# Patient Record
Sex: FEMALE | Race: WHITE
[De-identification: names, ages, dates, MRNs, and addresses within clinical notes are randomized per-mention and may not be internally consistent; named-entity substitution may affect disease eponyms.]

---

## 2018-10-03 NOTE — HP
PREOPERATIVE HISTORY AND PHYSICAL:

 

DATE OF ADMISSION:  10/11/18

 

PROVIDER:  Dr. Tori Arreola.* (DICTATED BY MARY BELLO)

 

CHIEF COMPLAINT:  Left knee pain.

 

HISTORY OF PRESENT ILLNESS:  Ms. Mehta is a 53-year-old female, who had an 
acute traumatic injury on 08/11/18, she fell on a boat propeller twisting her 
left knee. She has had mechanical symptoms along the medial joint line since 
that time with continued 5/10 pain in the knee.  She has clicking, catching, 
and feels like something is moving.  She tried antiinflammatories and bracing.  
She also tried icing and resting without any relief of pain.  She is interested 
in surgical intervention for correction of the problem.

 

PAST MEDICAL HISTORY:  Hypercholesterolemia and anxiety.

 

PAST SURGICAL HISTORY:  Ablation surgery, tubular pregnancy surgery with 
removal of the tube.

 

She reports no complications with anesthesia with those procedures.

 

CURRENT MEDICATIONS:

1.  Lexapro 20 mg p.o. q. day.

2.  Vitamin D supplementation daily.

 

ALLERGIES:  PENICILLIN.

 

FAMILY HISTORY:  Positive for hypertension in her mom and sister.

 

SOCIAL HISTORY:  She works as a registered nurse at [employer]. She lives with her .  She smokes 7 cigarettes per day.  She 
drinks 2 alcoholic beverages per week and she is normally very active.

 

REVIEW OF SYSTEMS:  A 14-point review of systems was discussed with the 
patient. All systems were negative except for left knee pain and swelling, 
history of night sweats, kidney stones, previous fracture, fatigue, and 
anxiety.  All other systems were negative.

 

                               PHYSICAL EXAMINATION

 

GENERAL:  She is a well-developed, well-nourished, pleasant female, in no acute 
distress at rest.  She is alert and oriented x3 with appropriate mood and 
affect.

 

VITAL SIGNS:  The patient is 5 feet 1 inch, 152 pounds.  Blood pressure 168/92, 
pulse 76, temperature 97.8.

 

HEENT:  Normocephalic, atraumatic.  Hearing and vision are grossly intact.

 

NECK:  Her trachea is midline.

 

RESPIRATORY:  Lungs are clear to auscultation bilaterally.  No wheezes, rales, 
or rhonchi.

 

CARDIOVASCULAR:  Regular rate and rhythm.  No murmurs, rubs, or gallops.  
Normal S1, S2.

 

ABDOMEN:  Soft, nondistended, nontender.  Normal bowel sounds.

 

EXTREMITIES:  Exam of the left lower extremity:  Her skin is intact without 
abrasions or open wounds.  She has a moderate effusion at the knee joint.  She 
is tender to palpation along the medial joint line.  There are no palpable 
masses or lymph nodes.  She has range of motion of 10 to 125 degrees of 
flexion.  Positive Apley's, positive Faiza's.  No varus or valgus 
instability.  Lachman's is stable as well.  Distally, she is neurovascularly 
intact.

 

GAIT:  She ambulates with an antalgic gait favoring left knee.  She has a 
significant limp and takes slow, shortened steps.  There are no obvious balance 
or coordination deficits.

 

 DIAGNOSTIC STUDIES:  Imaging:  MRI of the left knee was reviewed with the 
patient. She has a large tear of the medial meniscus body and posterior horn.  
She has an avulsion of the inner third with some displacement into the medial 
gutter.  She has some moderate grade hyaline cartilage fissuring along the 
medial femoral condyle and a chronic partial tear of the ACL.

 

IMPRESSION:  Left knee medial meniscus tear.

 

PLAN:  The patient is to undergo left knee arthroscopic surgery with partial 
meniscectomy, possible chondroplasty by Dr. Arreola on 10/11/18.  The risks, 
benefits, and postoperative course were discussed with the patient at length 
and she would like to proceed.  All of her questions were answered to her full 
satisfaction.

 

 ____________________________________ MARY BELLO

 

[id number] #: [id number]

MTDMADELYN

## 2018-10-11 ENCOUNTER — HOSPITAL ENCOUNTER (OUTPATIENT)
Dept: HOSPITAL 25 - OR | Age: 53
Discharge: HOME | End: 2018-10-11
Attending: ORTHOPAEDIC SURGERY
Payer: COMMERCIAL

## 2018-10-11 VITALS — DIASTOLIC BLOOD PRESSURE: 90 MMHG | SYSTOLIC BLOOD PRESSURE: 124 MMHG

## 2018-10-11 DIAGNOSIS — E78.5: ICD-10-CM

## 2018-10-11 DIAGNOSIS — F41.8: ICD-10-CM

## 2018-10-11 DIAGNOSIS — S83.232A: Primary | ICD-10-CM

## 2018-10-11 DIAGNOSIS — M17.12: ICD-10-CM

## 2018-10-11 DIAGNOSIS — Z72.0: ICD-10-CM
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
## 2018-10-11 DIAGNOSIS — E78.00: ICD-10-CM
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
## 2018-10-11 RX ADMIN — FENTANYL CITRATE PRN MCG: 0.05 INJECTION, SOLUTION INTRAMUSCULAR; INTRAVENOUS at 10:13

## 2018-10-11 RX ADMIN — FENTANYL CITRATE PRN MCG: 0.05 INJECTION, SOLUTION INTRAMUSCULAR; INTRAVENOUS at 10:00

## 2018-10-12 NOTE — OP
OPERATIVE NOTE:

 

DATE OF OPERATION:  10/11/18 - SDS

 

DATE OF BIRTH:  [birth year]

 

ATTENDING SURGEON:  Tori Arreola MD

 

ASSISTANT:  MARY Villasenor

 

Mr. Irby did help throughout the procedure with preparation of the leg, wound 
retraction, manipulation of the knee and wound closure.

 

ANESTHESIOLOGIST:  Dr. Lin.

 

ANESTHESIA:  General.

 

PRE-OP DIAGNOSES:  Left knee medial meniscal tear, mild osteoarthritis.

 

POST-OP DIAGNOSIS:  Left knee medial meniscal tear, severe osteoarthritis.

 

OPERATIVE PROCEDURE:  Left knee arthroscopy with partial medial meniscectomy 
and medial chondroplasty.

 

COMPLICATIONS:  None.

 

ESTIMATED BLOOD LOSS:  Less than 25 cc.

 

SPECIMEN:  None.

 

BRIEF HISTORY/INDICATIONS:  Ms. Mehta is a 53-year-old female who had a 
twisting injury to her knee in August.  She developed mechanical symptoms along 
the medial joint line.  She failed conservative treatment and an MRI did 
confirm a medial meniscal tear.  The patient failed conservative treatment and 
continued to have recurrent medial joint line pain and mechanical symptoms.  
She elected to undergo left knee arthroscopy with partial meniscectomy, 
possible chondroplasty, possible synovectomy due to continued pain and 
decreased quality of life.  Informed consent was obtained from the patient.  
She understood the risks of surgery included, but were not limited to bleeding, 
infection, damage to nearby structures, continued pain, need for further surgery
, retear of the meniscus, progression of arthritis, stroke, heart attack, blood 
clot, and death.  She wished to proceed.

 

INTRAOPERATIVE FINDINGS:  Intraoperatively, the patient was noted to have a 
complex tear of the medial meniscus involving the posterior two thirds.  This 
was mainly in the white-red zone.  She did have a large fragment, which was 
flipped into the medial gutter.  She had grade 3 and 4 Outerbridge cartilage 
changes of the medial compartment with exposed subchondral bone and cartilage 
flapping.

 

DESCRIPTION OF PROCEDURE:  Ms. Mehta was identified in the preanesthesia unit.  
Her left lower extremity was marked as the correct operative side.  Informed 
consent was signed and placed in the chart.  The patient was taken to the 
operating room and placed under general anesthesia.  Left lower extremity was 
prepped and draped in the usual sterile fashion.  Preop time-out was made to 
correctly identify the patient, side and site.  Appropriate perioperative 
antibiotics were given within 1 hour of incision.

 

A 0.5 cm anterolateral portal incision was made with a 15-blade and carried 
down through the skin and capsule.  Trocar was introduced.  As soon as the 
light and water sources were turned on, there was immediate visualization of 
the suprapatellar pouch.  A tour was performed.  Suprapatellar pouch had no 
obvious abnormality.  Patellofemoral compartment had some grade 2 and 3 minimal 
cartilage changes.  Medial gutter showed no plica or loose body.  Medial 
compartment showed grade 3 and 4 Outerbridge cartilage changes involving the 
majority of the weightbearing surface of the medial femoral condyle.  There was 
a large amount of cartilage flapping and exposed subchondral bone.  Medial 
meniscus had a complex tear.  This involved the posterior two thirds of the 
medial meniscus.  ACL and PCL appeared to be intact.  The knee was placed in 
the figure-of-four position.  There was no obvious meniscal tear.  No 
significant degenerative changes in the lateral compartment.  The lateral 
gutter showed no loose body or plica.

 

Under direct visualization, a medial portal incision was made.  Probe was 
introduced.  There were no additional findings.  Probing of the medial meniscus 
displaced a large meniscal tear fragment from the medial gutter into the joint 
space.  Straight biter and shaver were used to perform partial medial 
meniscectomy. A smooth border of the medial meniscus was obtained in the white-
red zone mainly. Further probing of the meniscus showed no additional displaced 
meniscal tears or fragments.  Radiofrequency ablation wand was used to further 
smooth the edge of the meniscus.  Radiofrequency ablation wand was then used to 
perform chondroplasty along the cartilage flaps in the medial compartment.

 

The knee was copiously irrigated with sterile saline.  The instruments were 
carefully removed.  The incisions were closed using 3-0 nylon suture. 
Intraarticular injection of 80 mg Depo-Medrol and 6 cc of 0.25% Marcaine was 
placed in the knee joint.  The patient's incisions were covered with Xeroform, 
4x4s, and Webril.  Ace wrap and cold pack were used to cover this.  The patient'
s anesthesia was reversed without difficulty.  She was taken to the PACU in 
stable condition. Intended weightbearing will be weightbearing as tolerated.  
Intended DVT prophylaxis will be aspirin.  She will follow up in 2 weeks' time 
for suture removal.

 

 [id number] #: [id number]

[facility] [place]

## 2018-11-07 ENCOUNTER — HOSPITAL ENCOUNTER (INPATIENT)
Dept: HOSPITAL 25 - SSU | Age: 53
LOS: 5 days | Discharge: HOME | DRG: 344 | End: 2018-11-12
Attending: ORTHOPAEDIC SURGERY | Admitting: ORTHOPAEDIC SURGERY
Payer: COMMERCIAL

## 2018-11-07 DIAGNOSIS — Z82.49: ICD-10-CM

## 2018-11-07 DIAGNOSIS — B95.61: ICD-10-CM

## 2018-11-07 DIAGNOSIS — I95.9: ICD-10-CM

## 2018-11-07 DIAGNOSIS — Z79.899: ICD-10-CM

## 2018-11-07 DIAGNOSIS — I10: ICD-10-CM

## 2018-11-07 DIAGNOSIS — E78.5: ICD-10-CM

## 2018-11-07 DIAGNOSIS — M00.062: Primary | ICD-10-CM

## 2018-11-07 DIAGNOSIS — F17.210: ICD-10-CM

## 2018-11-07 DIAGNOSIS — Z88.0: ICD-10-CM

## 2018-11-07 DIAGNOSIS — F41.9: ICD-10-CM

## 2018-11-07 LAB
BASOPHILS # BLD AUTO: 0 10^3/UL (ref 0–0.2)
BASOPHILS # BLD AUTO: 0 10^3/UL (ref 0–0.2)
EOSINOPHIL # BLD AUTO: 0.2 10^3/UL (ref 0–0.6)
EOSINOPHIL # BLD AUTO: 0.3 10^3/UL (ref 0–0.6)
HCT VFR BLD AUTO: 32 % (ref 35–47)
HCT VFR BLD AUTO: 36 % (ref 35–47)
HGB BLD-MCNC: 11 G/DL (ref 12–16)
HGB BLD-MCNC: 12.2 G/DL (ref 12–16)
INR PPP/BLD: 0.86 (ref 0.77–1.02)
LYMPHOCYTES # BLD AUTO: 2.2 10^3/UL (ref 1–4.8)
LYMPHOCYTES # BLD AUTO: 2.4 10^3/UL (ref 1–4.8)
MCH RBC QN AUTO: 31 PG (ref 27–31)
MCH RBC QN AUTO: 31 PG (ref 27–31)
MCHC RBC AUTO-ENTMCNC: 34 G/DL (ref 31–36)
MCHC RBC AUTO-ENTMCNC: 35 G/DL (ref 31–36)
MCV RBC AUTO: 89 FL (ref 80–97)
MCV RBC AUTO: 90 FL (ref 80–97)
MONOCYTES # BLD AUTO: 0.6 10^3/UL (ref 0–0.8)
MONOCYTES # BLD AUTO: 0.6 10^3/UL (ref 0–0.8)
NEUTROPHILS # BLD AUTO: 3.4 10^3/UL (ref 1.5–7.7)
NEUTROPHILS # BLD AUTO: 4.6 10^3/UL (ref 1.5–7.7)
NRBC # BLD AUTO: 0 10^3/UL
NRBC # BLD AUTO: 0 10^3/UL
NRBC BLD QL AUTO: 0
NRBC BLD QL AUTO: 0.1
PLATELET # BLD AUTO: 221 10^3/UL (ref 150–450)
PLATELET # BLD AUTO: 249 10^3/UL (ref 150–450)
RBC # BLD AUTO: 3.56 10^6/UL (ref 4–5.4)
RBC # BLD AUTO: 3.99 10^6/UL (ref 4–5.4)
RBC UR QL AUTO: (no result)
WBC # BLD AUTO: 6.6 10^3/UL (ref 3.5–10.8)
WBC # BLD AUTO: 7.9 10^3/UL (ref 3.5–10.8)
WBC UR QL AUTO: (no result)

## 2018-11-07 PROCEDURE — 80048 BASIC METABOLIC PNL TOTAL CA: CPT

## 2018-11-07 PROCEDURE — 81015 MICROSCOPIC EXAM OF URINE: CPT

## 2018-11-07 PROCEDURE — 90686 IIV4 VACC NO PRSV 0.5 ML IM: CPT

## 2018-11-07 PROCEDURE — 0S9D3ZX DRAINAGE OF LEFT KNEE JOINT, PERCUTANEOUS APPROACH, DIAGNOSTIC: ICD-10-PCS | Performed by: ORTHOPAEDIC SURGERY

## 2018-11-07 PROCEDURE — 71045 X-RAY EXAM CHEST 1 VIEW: CPT

## 2018-11-07 PROCEDURE — 36415 COLL VENOUS BLD VENIPUNCTURE: CPT

## 2018-11-07 PROCEDURE — 86140 C-REACTIVE PROTEIN: CPT

## 2018-11-07 PROCEDURE — 84100 ASSAY OF PHOSPHORUS: CPT

## 2018-11-07 PROCEDURE — 81003 URINALYSIS AUTO W/O SCOPE: CPT

## 2018-11-07 PROCEDURE — 85027 COMPLETE CBC AUTOMATED: CPT

## 2018-11-07 PROCEDURE — 80053 COMPREHEN METABOLIC PANEL: CPT

## 2018-11-07 PROCEDURE — 93005 ELECTROCARDIOGRAM TRACING: CPT

## 2018-11-07 PROCEDURE — 83735 ASSAY OF MAGNESIUM: CPT

## 2018-11-07 PROCEDURE — 85610 PROTHROMBIN TIME: CPT

## 2018-11-07 PROCEDURE — 87086 URINE CULTURE/COLONY COUNT: CPT

## 2018-11-07 PROCEDURE — C1751 CATH, INF, PER/CENT/MIDLINE: HCPCS

## 2018-11-07 PROCEDURE — 85025 COMPLETE CBC W/AUTO DIFF WBC: CPT

## 2018-11-07 PROCEDURE — 83605 ASSAY OF LACTIC ACID: CPT

## 2018-11-07 PROCEDURE — 80202 ASSAY OF VANCOMYCIN: CPT

## 2018-11-07 PROCEDURE — 3E1U38X IRRIGATION OF JOINTS USING IRRIGATING SUBSTANCE, PERCUTANEOUS APPROACH, DIAGNOSTIC: ICD-10-PCS | Performed by: ORTHOPAEDIC SURGERY

## 2018-11-07 PROCEDURE — 87073 CULTURE BACTERIA ANAEROBIC: CPT

## 2018-11-07 PROCEDURE — 87205 SMEAR GRAM STAIN: CPT

## 2018-11-07 PROCEDURE — 87070 CULTURE OTHR SPECIMN AEROBIC: CPT

## 2018-11-07 PROCEDURE — 87040 BLOOD CULTURE FOR BACTERIA: CPT

## 2018-11-07 PROCEDURE — 85652 RBC SED RATE AUTOMATED: CPT

## 2018-11-07 RX ADMIN — FENTANYL CITRATE PRN MCG: 0.05 INJECTION, SOLUTION INTRAMUSCULAR; INTRAVENOUS at 19:17

## 2018-11-07 RX ADMIN — FENTANYL CITRATE PRN MCG: 0.05 INJECTION, SOLUTION INTRAMUSCULAR; INTRAVENOUS at 19:00

## 2018-11-07 RX ADMIN — FENTANYL CITRATE PRN MCG: 0.05 INJECTION, SOLUTION INTRAMUSCULAR; INTRAVENOUS at 18:43

## 2018-11-07 RX ADMIN — FENTANYL CITRATE PRN MCG: 0.05 INJECTION, SOLUTION INTRAMUSCULAR; INTRAVENOUS at 18:37

## 2018-11-07 NOTE — PN
Hospitalist Progress Note


Date of Service: 11/07/18





got called reg her sbp down to 70s ---> pt is asymptomatic ---> stat cbc/bmp/

lactic and ua ordered bolus one liter of ns ---> sbp went up to 94 ---> one 

dose of narcan 0.4 given ---> sbp 119 ---> pt looks comfortable after narcan ---

> she will get her benadryl and melatonin for sleep for now then readdress her 

pain regimen in am

## 2018-11-07 NOTE — HP
PRE-ADMISSION HISTORY AND PHYSICAL EXAM:

 

DATE OF ADMISSION:  11/07/18

 

CHIEF COMPLAINT:  Left knee pain.

 

INTERVAL HISTORY:  Ms. Mehta is a 53-year-old female who had on 10/11/18 left 
knee arthroscopy with partial medial meniscectomy and medial chondroplasty.  
She had twisting injury to the knee originally in August 2018 with subsequent 
mechanical symptoms.  MRI confirmed a medial meniscal tear.  She failed 
conservative treatment and underwent a successful arthroscopy on 10/11/18. At 
the patient's postoperative appointment, her sutures were removed. Her 
incisions were healing well with no sign of infection.  She was doing quite 
well and wished to return to work.

 

Today, the patient reports over the last 24 hours, she developed severe pain in 
the left knee.  She had difficulty bearing weight last evening and called the 
office this morning. She denies any fevers, but has had chills and has not felt 
well over the last 24 hours.  She reports that last Friday 5 days ago, she had 
a small amount of clear and then purulent drainage from the lateral portal 
incision, but since then the portal closed up and did not drain any further.  
She is having increased 10/10 pain with any bending or extending the knee.

 

PAST MEDICAL HISTORY:  Hypercholesterolemia, anxiety.

 

PAST SURGICAL HISTORY:  Tubular pregnancy ablation; on 10/11/18, left knee 
arthroscopy.

 

FAMILY HISTORY:  Maternal hypertension.

 

SOCIAL HISTORY:  The patient works as a nurse.  She lives with her .  
She does smoke tobacco.  Has 2 alcoholic beverages per week.  Normally very 
active, right hand dominant.

 

REVIEW OF SYSTEMS:  Positive for the recent left knee pain, some chills. 
Otherwise, the patient reports review of systems is negative or not relevant.

 

                               PHYSICAL EXAMINATION

 

GENERAL:  The patient is a well nourished female, in obvious distress, she is 
in obvious pain.  Alert and oriented x3, pleasant mood, appropriate affect.

 

VITAL SIGNS:  Temperature 98.4, blood pressure 138/90, heart rate 75, BMI of 
28.3.

 

GAIT:  The patient's gait is antalgic.  She is having difficulty stepping on 
the left knee.  Coordination normal.

 

HEENT:  Atraumatic, normocephalic.  Pupils equal and reactive to light.

 

LUNGS:  Clear to auscultation in all lung fields.

 

HEART:  S1 and S2.

 

ABDOMEN:  Soft, nontender, nondistended.

 

EXTREMITIES:  Left lower extremity, the patient's skin is intact.  Her lateral 
portal incision does have a scab with a small amount of erythema.  She has a 
moderate to large effusion at the knee joint.  Any palpation in the 
peripatellar region causes extreme pain.  She has 20 to 80 degrees of flexion 
because of pain. Distally neurovascularly intact.  Calf is soft.

 

 RADIOGRAPHY:  Plain films of the left knee are obtained in clinic and showed 
no obvious abnormality.

 

ASSESSMENT AND PLAN:  Ms. Mehta is a 53-year-old female who had left knee 
arthroscopy with partial medial meniscectomy on 10/11/18.  Initially, she did 
quite well.  Today in clinic, I am concerned about infection of the left knee 
joint.  She does have history of some purulent drainage, although she is not 
draining at this point.

 

The patient consented to have aspiration of the left knee to further evaluate 
the effusion and the type of fluid.  Her left knee was sterilely prepped with 
Betadine. An 18-gauge needle was used to aspirate 20 cc of thick purulent 
yellow fluid.

 

The patient and I discussed that I felt her left knee was infected.  She has 
not had anything to eat or drink since 7 a.m.  She had black coffee.  We will 
direct her to [facility] for an infected left knee joint.  We will 
take her to the next available operating room for arthroscopic washout of the 
left knee.  We will send the fluid aspirated now for cultures and 
sensitivities.  Upon admission to the hospital, she will have a Hospitalist 
consult and Infectious Disease consult.  We will obtain some laboratory values.

 

She will be limited activity with p.r.n. analgesia.

 

The patient understands the treatment plan and agrees.  We will see her at the 
hospital in the preanesthesia unit.

 

 

 

[id number]/CPS #: [id number]

[facility]

## 2018-11-07 NOTE — HP
*** AMENDED REPORT NOW INCLUDES DESIGNATED COSIGNER ***

 

DATE OF ADMISSION:  11/07/2018.

 

ATTENDING PHYSICIAN:  Dr. Tori Arreola * (dictated by MARY Jasso).

 

CHIEF COMPLAINT:  Left knee pain.

 

HISTORY OF PRESENT ILLNESS:  Ms. Mehta is a 53-year-old female with five days 
of increasingly severe left knee pain, she was sent in as a direct admit from 
our office.  Her knee was aspirated today and fluid sent for analysis.  The 
patient reports that the aspirate material was cloudy in nature and she was 
sent immediately to [facility] for direct admission where it is 
anticipated that she will have a washout of her left knee tonight. She does 
have a history of left knee arthroscopy with partial medial meniscectomy and 
medial chondroplasty on 10/11/2018.  The patient states that she was healing 
quite well after the surgery and had the ability to walk unassisted without pain
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 During her healing process she went 
to our [facility] office for suture removal and there was no erythema or edema 
at this time.  She did have some mild clear discharge from one of the incision 
sites soon after suture removal which later progressed to cloudy colored 
drainage.  She did not have any redness of the knee and really did not have any 
pain of the knee until Saturday November 3rd.  November 3rd, the patient did 
quite a bit of yard work and closed her pool, stating that she thought that she 
overdid it and her knee became very swollen and painful.  Her knee since then 
has become exceedingly more painful and swollen without any relieving factors. 
Activity worsens the pain.  She has been feeling nauseous. She has not had any 
known fever or chills.  She does not have any recent illness. The patient's 
last meal was yesterday.  Her only oral consumption today was coffee with 
breakfast this morning.  She does not have a history of heart attack, stroke, 
blood clot, HIV, or hepatitis.

 

PAST MEDICAL HISTORY:  Significant for hypertension and anxiety.

 

PAST SURGICAL HISTORY:  Significant for ablation surgery, tubal pregnancy 
surgery, and left knee arthroscopy.  The patient had no complications with 
anesthesia in the past. 



CURRENT MEDICATIONS:

1.  Lexapro 20 mg p.o. daily.

2.  Vitamin D tablet daily.

3.  Atenolol 25 mg p.o. q.a.m.

 

FAMILY HISTORY:  Significant for hypertension.

 

SOCIAL HISTORY:  Patient works as a registered nurse.  She lives at home with 
her .  She smokes less than half-a-pack of cigarettes per day.  She 
drinks alcohol socially, less than once per week. No drug use.  She generally 
walks unassisted.

 

REVIEW OF SYSTEMS:  General:  Negative for fevers, chills or recent illness.  
Head: No head trauma, no headaches.  Eyes: No changes in vision. Ears: No 
changes in hearing.  Cardiac: No history of heart attack. Does have 
hypertension.  No other history of cardiac disease.  Does not have any chest 
pain or irregular beats at this time.  Respiratory:  No history of asthma or 
COPD.  Does not have any cough or shortness of breath.  Abdomen:  Positive for 
nausea.  No vomiting, diarrhea, or abdominal pain.  :  No dysuria, no 
difficulties with urination.  Musculoskeletal: Positive for left knee pain.  No 
other joint or extremity pain.  Neuro:  No decreased sensation throughout the 
extremities.  No numbness. Skin:  no reported rash or lesions            .  
Psych:  Positive for anxiety.  Hematology:  Negative for history of blood clot.

 

                               PHYSICAL EXAMINATION

 

GENERAL:  Well-developed, well-nourished, pleasant female in no acute distress. 
She carries on an appropriate conversation.

 

VITAL SIGNS:  Temperature 99.3, pulse rate 95, respiratory rate 16, oxygen 
saturation 99, blood pressure 140/89.

 

HEENT:  Head normocephalic, atraumatic.  Eyes:  Extraocular movements intact. 
Pupils equally round and reactive to light.

 

CARDIAC:  S1, S2.  No murmur appreciated.

 

RESPIRATORY:  Clear to auscultation bilaterally without wheezes, rales or 
rhonchi.

 

ABDOMEN:  Soft, nontender.  Bowel sounds normoactive.  No guarding, no rigidity.

 

EXTREMITIES:  Bilateral upper extremities without any obvious deformity.  Skin 
envelope is intact without erythema.  No tenderness to palpation.  Able to flex 
and extend her elbows, wrists, and digits well, forward flexion and abduction 
of shoulders intact and nonpainful.  Right lower extremity:  Skin envelope is 
intact without erythema. No obvious deformity.  Nontender to palpation.  Able 
to flex and extend her hip, knee, ankle and MTP's without any pain.  



Left lower extremity:  She is able to flex and extend her hip, ankle, and MTP's 
well. She is able to flex her left knee to roughly 80 degrees and extend to 15 
degrees, though she does have pain throughout the entirety of the range of 
motion.  She is tender to palpation globally and the knee is moderately 
edematous. There is no active discharge at this time

 

NEUROLOGIC:  Sensation is intact throughout the left lower extremity to light 
touch.

 

VASCULAR:  DP pulse left lower extremity is 2+.  Capillary refill less than two 
seconds distally.

 

DIAGNOSTIC STUDIES:  Knee x-ray done today:  Interpretation by Radiology:  
There may be a small suprapatellar effusion, otherwise unremarkable left knee.  
Minimal joint space narrowing medial compartment.

 

 LABORATORY DATA:  Not yet returned.  There will be fluid analysis from the 
fluid drawn at [facility].  Additionally ordered is a CMP, BMP, INR, blood 
cultures, lactic acid, sed rate and CRP.

 

ASSESSMENT:  infected left native knee joint.

 

PLAN:  The patient can be weightbearing as tolerated with assistance to avoid 
falls due to pain.  She will be NPO.  We will not start any antibiotics until 
we have done intraoperative cultures.  She will be taken to the operating room 
with Dr. Arreola today.  I will not start any anticoagulation or antibiotics 
until after surgery.  I anticipate that she will be on both chemical and 
mechanical DVT prophylaxis as well as Vancomycin postoperatively. I have 
contacted infectious disease who will consult on the patient tomorrow. The 
patient is in agreement to undergo a washout of her left knee with Dr. Arreola 
today.

 

____________________________________ MARY PALMA

 

[id number]/CPS #: [id number]

FANI

## 2018-11-07 NOTE — CONS
CC:  Pal Maciel NP; Dr. Vazquez; Dr. Arreola; Dr. Lal *

 

CONSULTATION REPORT:

 

DATE OF CONSULT:  11/07/18

 

PRIMARY CARE PROVIDER:  Pal Maciel NP

 

REQUESTING PHYSICIAN IN CONSULT:  Dr. Arreola

 

ATTENDING PHYSICIAN WHILE IN THE HOSPITAL:  Tiffany Mata DO (report dictated 
by Manuel Bustamante NP)

 

REASON FOR MEDICAL CONSULT:  Medical evaluation and comorbid medical problems.

 

HISTORY OF PRESENT ILLNESS:  Mrs. Mehta is a 53-year-old female patient that 
underwent knee arthroscopy on 10/03/18 for a meniscus repair. She had been 
doing initially well.  Sutures were removed and there was some serous drainage 
at that point.  The patient unfortunately though over the weekend noted that 
the knee started becoming more painful.  She thought may be she had overdone it 
because she was closing a pool over the weekend and was very active more than 
what she had been since the surgery and she then noted that over the next 
course of the weekend and throughout the day, the knee had become more swollen, 
painful, warm, hot.  She got into see Dr. Arreola in the outpatient setting and 
the knee was aspirated.  The fluid was noted to be cloudy.  The patient does 
admit to having chills.  She does state that it is quite painful to move the 
leg.  She denies having any chest pain.  She does admit to nausea, but no 
abdominal pain, no shortness of breath.  She states when she is feeling well 
and her knees are not bothering her, she can walk up a flight of stairs and she 
is not having any chest pain or shortness of breath.  She does carry history of 
hypertension, hyperlipidemia, and anxiety, history of smoking and because of 
her medical complexity, we were asked to evaluate and for help of risk 
stratification.

 

PAST MEDICAL HISTORY:  Significant for:

1.  Hyperlipidemia.

2.  Hypertension.

3.  Anxiety.

 

PAST SURGICAL HISTORY:

1.  She has had ablation.

2.  Ectopic pregnancy.

3.  Left knee arthroscopy.

4.  Bladder surgery.

 

HOME MEDICATIONS:  According to list provided include:

1.  Percocet 2 tablets every 4 hours as needed.

2.  Atenolol 25 mg daily.

3.  Lexapro 20 mg daily.

4.  Vitamin D 6000 units p.o. q.a.m.

 

ALLERGIES TO MEDICATIONS:  Include PENICILLIN.

 

FAMILY HISTORY:  Her mother had hypertension. Father is alive and healthy.

 

SOCIAL HISTORY:  She is a registered nurse.  She smokes 2 packs of cigarettes a 
week.  She rarely drinks alcohol.  Surrogate decision maker is her , 
Jai.

 

REVIEW OF SYSTEMS:  There is no documented fever.  She denies having any 
significant weight change.  There is no double vision.  She denies having any 
ear discharge.  There is no rhinorrhea, no sore throat, no thyroid enlargement.
  She denied having any chest pain.  There is no orthopnea.  There is no 
nocturnal dyspnea.  She denies having any abdominal pain.  There is no nausea, 
no vomiting. No dysuria, no frequency.  No seizure, no loss of consciousness.  
No pruritus and no skin ulcerations.  Review of 14 systems was completed, all 
others negative.

 

PHYSICAL EXAM:  Blood pressure 140/89, pulse 95, respirations 16, O2 sat 99%, 
temperature 99.3.  General:  At this time, Mrs. Mehta is a 53-year-old female 
patient.  She is sitting in the hospital bed.  She does not appear to be in any 
acute distress.  HEENT:  Head:  Atraumatic and normocephalic.  Eyes:  EOMs are 
intact.  Sclerae anicteric and not pale.  Neck was supple.  Throat:  Oral 
mucosa appears to be moist. No oropharyngeal erythema.  Heart: Sounds S1, S2.  
She had a regular rate and rhythm.  No murmurs, rubs, or gallops.  Lungs were 
clear to auscultation bilaterally.  There were no wheezes, rales, or rhonchi.  
Abdomen was soft.  It was flat, nontender.  Bowel sounds were present.  
Extremities:  Distal CSM checks are intact in the left lower extremity.  Left 
knee is noted to be edematous.  There is pain on flexion and extension of the 
knee.  She does have warmth to palpation.  There is no erythema, but there is 
certainly effusion there compared to the right knee.  Neurologically, she is 
awake, she is alert, she is oriented x3.  She has no gross focal deficits.  Her 
skin was intact.

 

DIAGNOSTIC STUDIES/LAB DATA:  Labs are pending from today.  There is a synovial 
fluid that is pending from the office that was drawn at 1 o'clock today.  Old 
medical records were reviewed.

 

ASSESSMENT AND PLAN:  Mrs. Mehta is a 53-year-old female patient coming into 
the orthopedic services today for concern for a septic joint.  We were asked to 
evaluate in consult.  My recommendations at this point are:

 

1.  Possible septic joint.  At this point, Dr. Arreola has already admitted and 
seen the patient. The plan will be for washout today.  I would recommend 
antibiotics in the form of vancomycin.  She is hemodynamically stable.  The 
orthopedic team is awaiting to washout to start antibiotic therapy after blood 
cultures have been sent, CBC has been sent, ESR, CRP, I did leave a consult to 
Dr. Lal.  We will try to get in touch with him to see if he can see the 
patient tomorrow, but at this point we will strive again for a quick washout.  
In terms of RCRI, she is at low risk; however, she has minimal risk for the 
proposed procedure.  She is medially optimized.  I am awaiting an EKG and chest 
x-ray given her history of hypertension and her history of smoking, but if 
these are stable, she has not had any active cardiac symptoms, then she could 
again proceed with the proposed procedure.

2.  Hypertension.  We will continue her atenolol and I would recommend 
continuing this throughout the perioperative course.

3.  Anxiety, continue with her Lexapro.

4.  Hyperlipidemia, follow up with her PCP.  She is not currently on statin 
because of complications related to taking it previously.

5.  DVT prophylaxis:  Defer to the primary team.

6.  Code status:  Full code.

7.  Fluids, electrolytes, and nutrition:  I would recommend a heart-healthy 
diet. She is n.p.o. currently for proposed procedure.

 

TIME SPENT:  Time spent on the consult was 60 minutes, greater than half the 
time was spent face-to-face with the patient obtaining my history and physical, 
other half time was spent going over the plan of care with the patient and 
implementing plan of care.

 

I did discuss the plan of care with my attending, Dr. Mata; she is in 
agreement.

 

____________________________________ MANUEL BUSTAMANTE NP

 

[id number] #: [id number]

FANI

## 2018-11-07 NOTE — XMS REPORT
Starla Mehta

 Created on:2018



Patient:Starla Mehta

Sex:Female

:1965

External Reference #:[id number]





Demographics







 Address  [address] [place], [place] [address]

 

 Mobile Phone  [phone]

 

 Email Address  [email or web address]

 

 Preferred Language  English

 

 Marital Status  Not  Or 

 

 Sabianism Affiliation  Unknown

 

 Race  White

 

 Ethnic Group  Not  Or 









Author







 Organization  [facility]

 

 Address  [address]



   [place] [address]

 

 Phone  [phone]









Support







 Name  Relationship  Address  Phone

 

 Jai Mehta  Unavailable  Unavailable  [phone]









Care Team Providers







 Name  Role  Phone

 

 Pal Maciel NP  Primary Care Physician  Unavailable









Payers







 Type  Date  Identification Numbers  Payment Provider  Subscriber

 

 Commercial    Policy Number: [id number]  [facility]  Starla Mehta









 PayID: [id number]  [address]









 [place] [address]







Problems







 Date  Description  Provider  Status

 

 Onset: 08/15/2018  Localized, primary osteoarthritis  Tori Arreola M.D.  
Active

 

 Onset: 08/15/2018  Sprain of medial collateral ligament of  Tori Arreola M.D.
  Active



   knee    

 

 Onset: 2018  Acute meniscal tear, medial, posterior  Tori Arreola M.D.  
Active



   horn    







Family History







 Date  Family Member(s)  Problem(s)  Comments

 

   General  Hypertension  

 

   General  Cancer  







Social History







 Type  Date  Description  Comments

 

 Lives With    Spouse  

 

 Occupation    Nurse  

 

 ETOH Use    Occasionally consumes alcohol  

 

 Smoking    Patient was a smoker, current status is unknown  

 

 Exercise Type/Frequency    Exercises sporadically  







Allergies, Adverse Reactions, Alerts







 Date  Description  Reaction  Status  Severity  Comments

 

 08/15/2018  Penicillin    active    







Medications







 Medication  Date  Status  Form  Strength  Qnty  SIG  Indications  Ordering



                 Provider

 

 Percocet  10/10/201  Active  Tablets  5-325mg  42tabs  1 by mouth    Tori



   8          every 4    MaxwellJOSÉ MIGUEL medranoD.



             hours as    



             needed    



             pain    

 

 Aspirin  10/10/201  Active  Tablets  325mg  28tabs  take 1 by    Tori



   8          mouth    Maxwell M.DSun



             twice a    



             day for    



             two weeks    

 

 Meloxicam  08/15/201  Active  Tablets  15mg  30tabs  1 by mouth  M25.562  
Tori



   8          every day    HILARIO Arreola

 

 Lexapro    Active  Tablets  20mg    1 by mouth    Unknown



   0          every day    

 

 Vitamin D    Active            Unknown



   0              







Vital Signs







 Date  Vital  Result  Comment

 

 10/22/2018  Height  61 inches  5'1"









 Weight  150.00 lb  

 

 BP Systolic  140 mmHg  

 

 BP Diastolic  86 mmHg  

 

 Body Temperature  97.5 F  

 

 Pain Level  0  

 

 BMI (Body Mass Index)  28.3 kg/m2  









 2018  Height  61 inches  5'1"









 Weight  152.00 lb  

 

 Heart Rate  76 /min  

 

 BP Systolic Sitting  168 mmHg  

 

 BP Diastolic Sitting  92 mmHg  

 

 Body Temperature  98.7 F  

 

 Pain Level  0  

 

 BMI (Body Mass Index)  28.7 kg/m2  









 2018  Height  61.5 inches  5'1.50"









 Heart Rate  100 /min  

 

 BP Systolic  148 mmHg  

 

 BP Diastolic  92 mmHg  

 

 Respiratory Rate  20 /min  

 

 Body Temperature  98.0 F  

 

 Pain Level  5  









 08/15/2018  Height  61.5 inches  5'1.50"









 Weight  157.00 lb  

 

 Heart Rate  72 /min  

 

 BP Systolic  130 mmHg  

 

 BP Diastolic  84 mmHg  

 

 BMI (Body Mass Index)  29.2 kg/m2  







Results







 Description

 

 No Information







Procedures







 Date  CPT Code  Description  Status

 

 10/11/2018  99805  Arthroscopy,Knee,Meniscectomy Medial Or Lateral  Completed

 

 10/11/2018  33853  Arthroscopy,Knee,Meniscectomy Medial Or Lateral  Completed







Encounters







 Type  Date  Location  Provider  CPT E/M  Dx

 

 Office Visit  2018  [facility]  Tori Arreola M.D.  [id number]  
M25.562



   11:45a  [facility]      









 M25.462

 

 M17.12

 

 S83.242A









 Office Visit  08/15/2018  8:15a  [facility]  Tori Arreola M.D.  
[id number]  M25.562



     [facility]      









 M25.462

 

 M17.12

 

 S83.412A







Plan of Care

Future Appointment(s):2018  8:45 am - Tori Ar[facility]ola M.D. at [facility]10/22/2018 - Tori Arreola M.D.M17.12 Unilateral primary 
osteoarthritis, left kneeFollow up:Follow up:   2 mefrgB10.462 Effusion, left 
kneeS83.242A Oth tear of medial meniscus, current injury, left knee, init

## 2018-11-07 NOTE — HP
H&P (Free Text)


History and Physical: 


 Full H&P dictated. Patient has been admitted for a native left knee septic 

joint. She is to remain NPO, off of anticoagulation until surgery, no 

antibiotics until intra op cultures taken. Hospitalists have seen and evaluated 

her, infectious disease will be consulted to evaluate tomorrow

## 2018-11-08 LAB
BASOPHILS # BLD AUTO: 0 10^3/UL (ref 0–0.2)
EOSINOPHIL # BLD AUTO: 0.2 10^3/UL (ref 0–0.6)
HCT VFR BLD AUTO: 32 % (ref 35–47)
HGB BLD-MCNC: 10.7 G/DL (ref 12–16)
LYMPHOCYTES # BLD AUTO: 2.2 10^3/UL (ref 1–4.8)
MCH RBC QN AUTO: 31 PG (ref 27–31)
MCHC RBC AUTO-ENTMCNC: 34 G/DL (ref 31–36)
MCV RBC AUTO: 90 FL (ref 80–97)
MONOCYTES # BLD AUTO: 0.6 10^3/UL (ref 0–0.8)
NEUTROPHILS # BLD AUTO: 3.3 10^3/UL (ref 1.5–7.7)
NRBC # BLD AUTO: 0 10^3/UL
NRBC BLD QL AUTO: 0
PLATELET # BLD AUTO: 209 10^3/UL (ref 150–450)
RBC # BLD AUTO: 3.51 10^6/UL (ref 4–5.4)
WBC # BLD AUTO: 6.3 10^3/UL (ref 3.5–10.8)

## 2018-11-08 PROCEDURE — 02HV33Z INSERTION OF INFUSION DEVICE INTO SUPERIOR VENA CAVA, PERCUTANEOUS APPROACH: ICD-10-PCS | Performed by: INTERNAL MEDICINE

## 2018-11-08 RX ADMIN — MORPHINE SULFATE PRN MG: 4 INJECTION INTRAVENOUS at 18:32

## 2018-11-08 RX ADMIN — TRAMADOL HYDROCHLORIDE PRN MG: 50 TABLET, FILM COATED ORAL at 11:47

## 2018-11-08 RX ADMIN — CITALOPRAM SCH MG: 40 TABLET ORAL at 08:50

## 2018-11-08 RX ADMIN — TRAMADOL HYDROCHLORIDE PRN MG: 50 TABLET, FILM COATED ORAL at 21:32

## 2018-11-08 RX ADMIN — ENOXAPARIN SODIUM SCH MG: 40 INJECTION SUBCUTANEOUS at 11:24

## 2018-11-08 RX ADMIN — OXYCODONE HYDROCHLORIDE AND ACETAMINOPHEN PRN TAB: 5; 325 TABLET ORAL at 18:36

## 2018-11-08 RX ADMIN — OXYCODONE HYDROCHLORIDE AND ACETAMINOPHEN PRN TAB: 5; 325 TABLET ORAL at 22:36

## 2018-11-08 RX ADMIN — ATENOLOL SCH: 25 TABLET ORAL at 08:55

## 2018-11-08 RX ADMIN — CEFAZOLIN SODIUM SCH MLS/HR: 2 SOLUTION INTRAVENOUS at 11:20

## 2018-11-08 RX ADMIN — OXYCODONE HYDROCHLORIDE AND ACETAMINOPHEN PRN TAB: 5; 325 TABLET ORAL at 13:18

## 2018-11-08 RX ADMIN — MORPHINE SULFATE PRN MG: 4 INJECTION INTRAVENOUS at 22:36

## 2018-11-08 RX ADMIN — OXYCODONE HYDROCHLORIDE AND ACETAMINOPHEN PRN TAB: 5; 325 TABLET ORAL at 06:00

## 2018-11-08 RX ADMIN — CEFAZOLIN SODIUM SCH MLS/HR: 2 SOLUTION INTRAVENOUS at 17:28

## 2018-11-08 RX ADMIN — Medication SCH ML: at 18:40

## 2018-11-08 NOTE — OP
DATE OF OPERATION:  11/07/18 - ROOM #[id number]

 

DATE OF BIRTH:  [birth year]

 

SURGEON:  Tori Arreola MD.

 

ANESTHESIOLOGIST:  Dr. Jaime.

 

ANESTHESIA:  General.

 

PRE-OP DIAGNOSIS:  Left knee joint intraarticular infection.

 

POST-OP DIAGNOSIS:  Left knee joint intraarticular infection.

 

OPERATIVE PROCEDURE:  Left knee arthroscopy with irrigation and debridement of 
the infected joint.

 

SPECIMENS:  Multiple culture swabs sent with left knee joint fluid sent for 
cultures and sensitivities.

 

ESTIMATED BLOOD LOSS:  50 cc.

 

COMPLICATIONS:  None.

 

BRIEF HISTORY/INDICATIONS:  Ms. Mehta is a 53-year-old female who had on 10/11/
18 uncomplicated left knee arthroscopy with partial medial meniscectomy. 
Postoperatively, she did quite well.  She returned to work after approximately 
2 weeks.  At her 2-week followup appointment, her incisions were intact and she 
was doing well.  The patient reports that 5 days ago, she did have some 
drainage from the left portal incision.  This drainage stopped.  Over the last 
24 hours, she developed 10/10 several pain and came in to my clinic urgently.  
I aspirated the knee joint and there was thick yellow purulent fluid.  I asked 
her to remain n.p.o. and she was directed to [facility] for an 
emergent washout of the knee joint infection.  Informed consent was obtained 
from the patient in the preanesthesia unit.  She understood the risks of the 
surgery included, but were not limited to bleeding, continued infection, need 
for further surgery, damage to nearby structures, continued pain, stiffness, 
stroke, heart attack, blood clot, and death.  She wished to proceed.

 

INTRAOPERATIVE FINDINGS:  In the preanesthesia unit, I did check the 
microbiology from the clinic aspiration which showed negative MRSA PCR, but 
positive staph. Gram stain was negative.  Cultures are pending.  
Intraoperatively, the patient had very little knee joint fluid to aspirate.  At 
the time of the arthroscopy, she did have cloudy joint fluid.  No significant 
signs of cartilage degeneration.

 

DESCRIPTION OF PROCEDURE:  Ms. Mehta is a 53-year-old female who was identified 
in the preanesthesia unit.  Informed consent was obtained from the patient.  
This was signed and placed in the chart.  Her left lower extremity was marked 
as the correct operative side. The patient was taken to the operating room and 
placed under general anesthesia.  Left lower extremity was prepped and draped 
in the usual sterile fashion.  Preop time-out was made to correctly identify 
the patient's side and site.  Appropriate perioperative antibiotics were held 
purposefully.  An 18- gauge needle was used to aspirate the knee joint.  5 cc 
of serosanguineous fluid were aspirated.  These were placed on culture swabs 
and sent to microbiology for cultures and sensitivities.  Significant amount of 
purulent fluid had been aspirated earlier in the day during the clinic visit.  
Lateral portal incision was opened with a 15-blade.  Trocar was introduced.  
The light and water sources were turned on.  The knee had some cloudy joint 
fluid.  A tour of the knee joint showed no significant cartilage loss.  There 
was no necrotic soft tissue.  12 L of sterile normal saline with epinephrine 
was used to irrigate the joint.  There was no further purulence visible.  Once 
again, the cartilage did not appear to be affected by the infection.  The 
instruments were carefully removed.  A curette was used on the lateral portal 
incision to remove any fibrous tissue.  Interrupted 3-0 nylon sutures were used 
to close the incisions.  Sterile Xeroform, 4x4s, and Webril were used to cover 
the incision.  Ace wrap and cold pack were placed over this.  The patient's 
anesthesia was reversed without difficulty.  She was taken to the PACU in 
stable condition.  Intended weightbearing will be weightbearing as tolerated. 
Intended DVT prophylaxis will be Lovenox.  The patient will have IV vancomycin 
per pharmacy protocol.  Infectious disease consult, 

Dr. Lal will see the patient tomorrow.  She will likely require a PICC 
line and 4 to 6 weeks of IV antibiotics.

 

 [id number]/CPS #: [id number]

MTDD

## 2018-11-08 NOTE — PN
Progress Note





- Progress Note


Date of Service: 11/08/18


SOAP: 


Subjective:


[]Patient was seen and examined at bedside. Left knee pain is drastically 

improved from yesterday, no feeling of fever or chills. She is without chest 

pain, shortness of breath or dizziness. Overnight she became hypotensive and 

required narcan. Her BP has been stable since and aside from hypotension on 

vital signs she states she felt entirely well. She feels well this morning does 

not like how percocet or heavier narcotics make her feel, desires to decrease 

pain medications.





Objective:


[]General: Well apperaing, NAD


LLE: Left knee dressing is CDI without any surrounding erythema. She is able to 

DF/PF without pain, 5-30 degrees knee ROM with minimal pain described as knee 

tightness rather than pain in the knee. DP2+, capillary refill brisk distally, 

sensation intact distally to light touch.


Calves supple and nontender without erythema, edema or palpable cords





Assessment:


[]POD 1 sp washout of native left knee joint intraarticular infection





Plan:


[]- Appreciate ID input. changed patient from vancomycin to ancef 2 g IV Q 8 

hours, PICC line ordered.


- Pain control: Discontinued oxycodone 10 mg tabs and decreased percocet 

availability from 2 tabs of 5/325 tabs to 1 tab every 4 hours. decreased 2mg 

morphine IV from Q2 hr to 2mg Q 4hr PRN. Added tramadol 50 mg Q 6 hours. She 

will utilize primarily tylenol and tramadol for pain control per patient 

preference.


 - monitor vitals, CRP, sed rate, WBC 


- Dressing change 11/9 and daily thereafter 





 Vital Signs











Temp  98.8 F   11/08/18 07:40


 


Pulse  86   11/08/18 07:40


 


Resp  18   11/08/18 11:47


 


BP  112/75   11/08/18 07:40


 


Pulse Ox  96   11/08/18 08:00








 Intake & Output











 11/07/18 11/08/18 11/08/18





 18:59 06:59 18:59


 


Intake Total 4952 818 7148


 


Output Total 1025 500 550


 


Balance 75 430 692


 


Weight 150 lb  


 


Intake:   


 


  IV Fluids 1100 510 682


 


    ABX - VANCOMYCIN  260 266


 


    LR   416


 


    NS 250ML, Vancomycin  250 





    1000MG   


 


    lr 1100  


 


  Oral 0 420 560


 


Output:   


 


  Urine 1000 500 550


 


  Estimated Blood Loss 25  


 


Other:   


 


  # Voids 1  








 Laboratory Last Values











WBC  6.3 10^3/ul (3.5-10.8)   11/08/18  06:00    


 


RBC  3.51 10^6/ul (4.00-5.40)  L  11/08/18  06:00    


 


Hgb  10.7 g/dl (12.0-16.0)  L  11/08/18  06:00    


 


Hct  32 % (35-47)  L  11/08/18  06:00    


 


MCV  90 fL (80-97)   11/08/18  06:00    


 


MCH  31 pg (27-31)   11/08/18  06:00    


 


MCHC  34 g/dl (31-36)   11/08/18  06:00    


 


RDW  13 % (10.5-15)   11/08/18  06:00    


 


Plt Count  209 10^3/ul (150-450)   11/08/18  06:00    


 


MPV  7.9 fL (7.4-10.4)   11/08/18  06:00    


 


Neut % (Auto)  51.5 % (38-83)   11/08/18  06:00    


 


Lymph % (Auto)  34.8 % (25-47)   11/08/18  06:00    


 


Mono % (Auto)  9.2 % (0-7)  H  11/08/18  06:00    


 


Eos % (Auto)  3.8 % (0-6)   11/08/18  06:00    


 


Baso % (Auto)  0.7 % (0-2)   11/08/18  06:00    


 


Absolute Neuts (auto)  3.3 10^3/ul (1.5-7.7)   11/08/18  06:00    


 


Absolute Lymphs (auto)  2.2 10^3/ul (1.0-4.8)   11/08/18  06:00    


 


Absolute Monos (auto)  0.6 10^3/ul (0-0.8)   11/08/18  06:00    


 


Absolute Eos (auto)  0.2 10^3/ul (0-0.6)   11/08/18  06:00    


 


Absolute Basos (auto)  0 10^3/ul (0-0.2)   11/08/18  06:00    


 


Absolute Nucleated RBC  0 10^3/ul  11/08/18  06:00    


 


Nucleated RBC %  0   11/08/18  06:00    


 


Hypogranular Platelets  Cancelled   11/08/18  05:50    


 


Clumped Platelets  Cancelled   11/08/18  05:50    


 


Large Platelets  Cancelled   11/08/18  05:50    


 


Giant Platelets  Cancelled   11/08/18  05:50    


 


ESR  22 mm/Hr (0-30)   11/07/18  15:39    


 


INR (Anticoag Therapy)  0.86  (0.77-1.02)   11/07/18  14:00    


 


Sodium  141 mmol/L (135-145)   11/08/18  06:00    


 


Potassium  4.3 mmol/L (3.5-5.0)   11/08/18  06:00    


 


Chloride  110 mmol/L (101-111)   11/08/18  06:00    


 


Carbon Dioxide  28 mmol/L (22-32)   11/08/18  06:00    


 


Anion Gap  3 mmol/L (2-11)   11/08/18  06:00    


 


BUN  12 mg/dL (6-24)   11/08/18  06:00    


 


Creatinine  0.71 mg/dL (0.51-0.95)   11/08/18  06:00    


 


Est GFR ( Amer)  104.6  (>60)   11/08/18  06:00    


 


Est GFR (Non-Af Amer)  86.4  (>60)   11/08/18  06:00    


 


BUN/Creatinine Ratio  16.9  (8-20)   11/08/18  06:00    


 


Glucose  105 mg/dL ()  H  11/08/18  06:00    


 


Lactic Acid  1.3 mmol/L (0.5-2.0)   11/07/18  22:46    


 


Calcium  8.6 mg/dL (8.6-10.3)   11/08/18  06:00    


 


C-Reactive Protein  40.79 mg/L (<8.01)  H  11/07/18  14:00    


 


Urine Color  Yellow   11/07/18  23:33    


 


Urine Appearance  Cloudy   11/07/18  23:33    


 


Urine pH  5.0  (5-9)   11/07/18  23:33    


 


Ur Specific Gravity  1.019  (1.010-1.030)   11/07/18  23:33    


 


Urine Protein  Negative  (Negative)   11/07/18  23:33    


 


Urine Ketones  Negative  (Negative)   11/07/18  23:33    


 


Urine Blood  Negative  (Negative)   11/07/18  23:33    


 


Urine Nitrate  Negative  (Negative)   11/07/18  23:33    


 


Urine Bilirubin  Negative  (Negative)   11/07/18  23:33    


 


Urine Urobilinogen  Negative  (Negative)   11/07/18  23:33    


 


Ur Leukocyte Esterase  2+  (Negative)  A  11/07/18  23:33    


 


Urine WBC (Auto)  3+(>20/hpf)  (Absent)  A  11/07/18  23:33    


 


Urine RBC (Auto)  2+(6-10/hpf)  (Absent)  A  11/07/18  23:33    


 


Ur Squamous Epith Cells  Present  (Absent)  A  11/07/18  23:33    


 


Urine Bacteria  Absent  (Absent)   11/07/18  23:33    


 


Hyaline Casts  Present  (Absent)  A  11/07/18  23:33    


 


Urine Glucose  Negative  (Negative)   11/07/18  23:33    


 


Vancomycin Trough  18.7 mcg/mL  11/08/18  06:00

## 2018-11-08 NOTE — PN
Subjective


Date of Service: 11/08/18


Interval History: 





Pt seen and examined.  Meds and labs reviewed.  Pt noted to be hypotensive and 

somewhat lethargic last night and was given Narcan with no other O/N issues.





CC:  N/A





ROS:  Denied HA/dizziness, F/C, N/V, CP, SOB, increased cough, sputum production

, abd pain, diarrhea, constipation, dysuria, myalgias, arthralgias, throat pain

, and new skin lesions.  The rest of the 14 point ROS are unremarkable.





PHYSICAL EXAM:


GEN APPEARANCE: Awake, not in acute distress


HEENT: NC/AT, PERRLA, moist oral mucosa, (-) throat erythema


NECK: Soft, supple, (-) cervical LAD, (-)JVD


HEART: S1S2 WNL, RRR, No MRG


CHEST: CTA, BL, GAE, No W/R/R


ABD: Soft, ND/NT, NABS 4x Q


EXT: No C/C/LLE (+)knee, cdi


SKIN: Warm to touch


PSYCH: No active psychosis, hallucinations, depression, SI/HI





Objective


Active Medications: 








Acetaminophen (Tylenol Tab*)  975 mg PO Q8H PRN


   PRN Reason: FEVER/PAIN


Atenolol (Tenormin Tab*)  25 mg PO QAM [facility]


   Last Admin: 11/08/18 08:55 Dose:  Not Given


Citalopram Hydrobromide (Celexa Tab*)  40 mg PO QAM [facility]


   Last Admin: 11/08/18 08:50 Dose:  40 mg


Diphenhydramine HCl (Benadryl Iv*)  12.5 mg IV Q6H PRN


   PRN Reason: PRURITIS


Diphenhydramine HCl (Benadryl Po*)  25 mg PO Q6H PRN


   PRN Reason: itching


Docusate Sodium (Colace Cap*)  100 mg PO BID PRN


   PRN Reason: CONSTIPATION


Enoxaparin Sodium (Lovenox(*))  40 mg SUBCUT DAILY@1200 [facility]


   Last Admin: 11/08/18 11:24 Dose:  40 mg


Lactated Ringer's (Lactated Ringers 1000 Ml Bag*)  1,000 mls @ 100 mls/hr IV 

PER RATE [facility]


   Last Admin: 11/07/18 14:27 Dose:  100 mls/hr


Lactated Ringer's (Lactated Ringers 1000 Ml Bag*)  1,000 mls @ 125 mls/hr IV 

PER RATE [facility]


Cefazolin Sodium/Dextrose (Kefzol 2 Gm Premix In Ors(*))  2 gm in 50 mls @ 100 

mls/hr IVPB Q8H [facility]


   Last Admin: 11/08/18 11:20 Dose:  100 mls/hr


Magnesium Hydroxide (Milk Of Magnesia Liq*)  30 ml PO Q6H PRN


   PRN Reason: constipation


Melatonin (Melatonin)  3 mg PO BEDTIME PRN


   PRN Reason: INSOMNIA


   Last Admin: 11/08/18 00:20 Dose:  3 mg


Morphine Sulfate (Morphine Vial*)  2 mg IV Q4H PRN


   PRN Reason: PAIN - UNRELIEVED


Oxycodone/Acetaminophen (Percocet 5/325 Tab*)  1 tab PO Q4H PRN


   PRN Reason: PAIN - MODERATE


   Last Admin: 11/08/18 06:00 Dose:  1 tab


Tramadol HCl (Ultram*)  50 mg PO Q6H PRN


   PRN Reason: PAIN - MODERATE


   Last Admin: 11/08/18 11:47 Dose:  50 mg








 Vital Signs - 8 hr











  11/08/18 11/08/18 11/08/18





  06:00 07:40 07:58


 


Temperature  98.8 F 


 


Pulse Rate  86 


 


Respiratory 16 12 20





Rate   


 


Blood Pressure  112/75 





(mmHg)   


 


O2 Sat by Pulse  96 





Oximetry   














  11/08/18 11/08/18 11/08/18





  08:00 11:47 11:52


 


Temperature   99.0 F


 


Pulse Rate   85


 


Respiratory 20 18 16





Rate   


 


Blood Pressure   124/73





(mmHg)   


 


O2 Sat by Pulse 96  97





Oximetry   











Oxygen Devices in Use Now: None


Result Diagrams: 


 11/08/18 06:00





 11/08/18 06:00


Microbiology and Other Data: 


 Microbiology











 11/07/18 17:54 Gram Stain - Final





 Knee Left Wound Culture - Preliminary





    No Growth Day 1


 


 11/07/18 17:54 Anaerobic Culture - Preliminary





 Body Fluid - Knee Left 














Assess/Plan/Problems-Billing


Assessment: 











- Patient Problems


(1) Infection of left knee


Current Visit: No   Status: Acute   Code(s): M00.9 - PYOGENIC ARTHRITIS, 

UNSPECIFIED   SNOMED Code(s): 861395058


   Comment: 


-S/P recent arthroscopy with partial medial meniscectomy on 10/11/18


-S/P Arthrocentesis on 11/07, w/wound PCR showing MSSA, final results of 

culture and sensitivity pending


-Continue Cefazolin and IVFs


-D/W Dr Lal


-Will be re-evaluated tomorrow to see if additional washouts will be needed

defer with ortho   





(2) HTN (hypertension)


Current Visit: Yes   Status: Acute   Code(s): I10 - ESSENTIAL (PRIMARY) 

HYPERTENSION   SNOMED Code(s): 94628886


   Comment: 


-Well-controlled


-Continue Atenolol   





(3) Anxiety


Current Visit: Yes   Status: Acute   Code(s): F41.9 - ANXIETY DISORDER, 

UNSPECIFIED   SNOMED Code(s): 83623164


   Comment: 


-Stablecontinue Citalopram   





(4) DVT prophylaxis


Current Visit: Yes   Status: Acute   Code(s): PLD0028 -    SNOMED Code(s): 

869840567


   Comment: 


-Continue Lovenox   


Status and Disposition: 





-Defer with orthopedic team

## 2018-11-08 NOTE — CONS
CONSULTATION NOTE:

 

DATE OF CONSULT:  11/08/18.

 

REQUESTING PHYSICIAN:  Dr. Arreola.

 

CONSULTING SERVICE:  Infectious disease.

 

REASON FOR CONSULTATION:  Septic arthritis.

 

IMPRESSION:

1.  Septic arthritis, left knee due to methicillin sensitive Staph aureus, 
status post incision and debridement.

2.  Left knee arthroscopy on 10/11/18 for meniscectomy and medial chondroplasty.

3.  PENICILLIN allergy, which caused an unknown breathing issue as a child.

 

RECOMMENDATIONS:  Stop vancomycin, start Ancef 2 g IV every 8 hours.  I will 
follow her for any reaction here, which is low likelihood.  She will need a 
weekly CBC, CMP, CRP and a PICC line which I have ordered.  We will plan on 4 
weeks of IV antibiotics.  Total duration pending resolution of inflammatory 
markers and her symptoms.

 

HISTORY OF PRESENT ILLNESS:  This is a 53-year-old woman with left knee pain 
and swelling.  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  
She had severe pain with weightbearing with some swelling.  No fever, chills or 
sweats. She was seen by Dr. Arreola who aspirated her knee.  There were 87629 
white cells, 87% neutrophils, the PCR with staph aureus positive.  MRSA 
negative.  Cultures pending.  She was taken last night for washout of the left 
knee, which she tolerated well.  She has had a couple of episodes of chills 
today and no fevers. Her appetite is good.  The knee pain is improved with the 
Cryo/Cuff.  She has not had an infection requiring hospitalization in the past.

 

PAST MEDICAL HISTORY:

1.  Left knee meniscal tear, status post meniscectomy October 2018.

2.  Hyperlipidemia.

3.  Anxiety.

 

MEDICATIONS:

1.  Tylenol.

2.  Atenolol.

3.  Celexa.

4.  Docusate.

5.  Melatonin.

6.  Vancomycin.

 

ALLERGIES:  PENICILLIN cause breathing issue, which she is not exactly sure of, 
as a young child.

 

FAMILY HISTORY:  Mother had hypertension.

 

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  She is a nonsmoker.  
She has no travel.

 

REVIEW OF SYSTEMS:  All negative to a 14-point review of systems except as 
noted above in the history of present illness.

 

PHYSICAL EXAM:  Vital Signs:  Temperature is 37, heart rate 80, respiratory 
rate 12, blood pressure 112/75, oxygen saturation 96% on room air.  In general, 
she is awake, not in distress.  Neurologic:  He is oriented x3.  Follows all 
commands. HEENT:  There is no conjunctival hemorrhage.  There is no thrush.  
Neck is supple without mass.  Heart is Regular rate and rhythm without murmurs, 
rubs or gallops. Lungs:  Clear to auscultation bilaterally.  Abdomen:  Soft, 
nontender, nondistended.  Bowel sounds present.  Skin:  There is no rash or 
splinter hemorrhage.  Musculoskeletal:  There is no spine tenderness to 
palpation.  Left knee, Cryo/Cuff is applied.  Sensation is intact to light 
touch in the left foot. There is trace edema.

 

DIAGNOSTIC STUDIES/LAB DATA:  White blood cell count 6, hemoglobin 10, 
platelets 209, creatinine is 0.7, CRP 41.

 

Please see impression and recommendations outlined above, which I have 
discussed with MARY Jasso.

 

Thank you for asking me to see Ms. Mehta in consultation.

 

 [id number]/[id number] #: [id number]

MTDD

## 2018-11-09 LAB
HCT VFR BLD AUTO: 29 % (ref 35–47)
HGB BLD-MCNC: 10.1 G/DL (ref 12–16)
MCH RBC QN AUTO: 31 PG (ref 27–31)
MCHC RBC AUTO-ENTMCNC: 34 G/DL (ref 31–36)
MCV RBC AUTO: 90 FL (ref 80–97)
PLATELET # BLD AUTO: 222 10^3/UL (ref 150–450)
RBC # BLD AUTO: 3.28 10^6/UL (ref 4–5.4)
WBC # BLD AUTO: 6.4 10^3/UL (ref 3.5–10.8)

## 2018-11-09 RX ADMIN — ATENOLOL SCH MG: 25 TABLET ORAL at 09:38

## 2018-11-09 RX ADMIN — CEFAZOLIN SODIUM SCH MLS/HR: 2 SOLUTION INTRAVENOUS at 12:17

## 2018-11-09 RX ADMIN — OXYCODONE HYDROCHLORIDE AND ACETAMINOPHEN PRN TAB: 5; 325 TABLET ORAL at 02:34

## 2018-11-09 RX ADMIN — CITALOPRAM SCH MG: 40 TABLET ORAL at 09:38

## 2018-11-09 RX ADMIN — ENOXAPARIN SODIUM SCH MG: 40 INJECTION SUBCUTANEOUS at 12:17

## 2018-11-09 RX ADMIN — MORPHINE SULFATE PRN MG: 4 INJECTION INTRAVENOUS at 16:07

## 2018-11-09 RX ADMIN — Medication SCH ML: at 20:09

## 2018-11-09 RX ADMIN — Medication SCH ML: at 05:34

## 2018-11-09 RX ADMIN — CEFAZOLIN SODIUM SCH MLS/HR: 2 SOLUTION INTRAVENOUS at 02:37

## 2018-11-09 RX ADMIN — MORPHINE SULFATE PRN MG: 4 INJECTION INTRAVENOUS at 02:35

## 2018-11-09 RX ADMIN — CEFAZOLIN SODIUM SCH MLS/HR: 2 SOLUTION INTRAVENOUS at 19:18

## 2018-11-09 RX ADMIN — OXYCODONE HYDROCHLORIDE AND ACETAMINOPHEN PRN TAB: 5; 325 TABLET ORAL at 15:55

## 2018-11-09 RX ADMIN — MORPHINE SULFATE PRN MG: 4 INJECTION INTRAVENOUS at 20:06

## 2018-11-09 RX ADMIN — MORPHINE SULFATE SCH MG: 15 TABLET, EXTENDED RELEASE ORAL at 19:57

## 2018-11-09 RX ADMIN — MORPHINE SULFATE SCH MG: 15 TABLET, EXTENDED RELEASE ORAL at 09:37

## 2018-11-09 RX ADMIN — OXYCODONE HYDROCHLORIDE AND ACETAMINOPHEN PRN TAB: 5; 325 TABLET ORAL at 19:56

## 2018-11-09 NOTE — PN
Progress Note





- Progress Note


Date of Service: 11/09/18


SOAP: 


Subjective:


CC: septic arthritis


HPI: 53 year old woman with left knee infection s/p I&D.  More pain today, ice 

helps. No fever, rash, or diarrhea.








Objective:





 Vital Signs











Temp  37.3 C   11/09/18 08:00


 


Pulse  88   11/09/18 08:00


 


Resp  18   11/09/18 09:37


 


BP  137/78   11/09/18 08:00


 


Pulse Ox  95   11/09/18 08:00








 Intake & Output











 11/08/18 11/09/18 11/09/18





 18:59 06:59 18:59


 


Intake Total 1942 1058 


 


Output Total 1400 1100 900


 


Balance 542 42 -900


 


Intake:   


 


  IV Fluids 832 20 


 


    ABX - CEFAZOLIN 110  


 


    ABX - VANCOMYCIN 266  


 


      


 


    NS (0.9%) 20 20 


 


  IVPB  58 


 


    ABX - CEFAZOLIN  58 


 


  Oral 1110 980 


 


Output:   


 


  Urine 1400 1100 900


 


Other:   


 


  # Bowel Movements 0  








Gen:awake, no distress


HEENT: no thrush


Heart:RRR no murmur


Lungs:CTA BL


Abd:+BS NTND soft


Skin: No rash


MSK: L knee edema no erythema


 Laboratory Results - last 24 hr











  11/09/18 11/09/18





  05:30 05:30


 


WBC   6.4


 


RBC   3.28 L


 


Hgb   10.1 L


 


Hct   29 L


 


MCV   90


 


MCH   31


 


MCHC   34


 


RDW   13


 


Plt Count   222


 


MPV   7.4


 


ESR   41 H


 


Sodium  139 


 


Potassium  4.0 


 


Chloride  106 


 


Carbon Dioxide  29 


 


Anion Gap  4 


 


BUN  7 


 


Creatinine  0.64 


 


Est GFR ( Amer)  117.9 


 


Est GFR (Non-Af Amer)  97.4 


 


BUN/Creatinine Ratio  10.9 


 


Glucose  115 H 


 


Calcium  8.8 


 


Phosphorus  3.1 


 


Magnesium  1.7 L 


 


Total Bilirubin  0.30 


 


AST  15 


 


ALT  15 


 


Alkaline Phosphatase  44 


 


C-Reactive Protein  74.51 H 


 


Total Protein  6.0 L 


 


Albumin  3.3 


 


Globulin  2.7 


 


Albumin/Globulin Ratio  1.2 

















Assessment:


1. MSSA septic arthritis, left knee


2. PCN allergy, tolerating ancef


3. elevated CRP








Plan:


1. ancef 2 gm IV Q8hrs day 2/28 with weekly cbc, cmp, crp


35 minutes floor time >50% face to face in counseling and coordinating home 

antibiotics

## 2018-11-09 NOTE — PN
Progress Note





- Progress Note


Date of Service: 11/09/18


SOAP: 


Subjective:


[]Patient seen at bedside, having some difficulty with pain control.  Long 

acting med added by Dr. Arreola this am.  Denies fever, chills, nausea or 

vomiting. 








Objective:


[]


 Vital Signs











Temp  99.2 F   11/09/18 08:00


 


Pulse  88   11/09/18 08:00


 


Resp  18   11/09/18 09:37


 


BP  137/78   11/09/18 08:00


 


Pulse Ox  95   11/09/18 08:00








 Intake & Output











 11/08/18 11/09/18 11/09/18





 18:59 06:59 18:59


 


Intake Total 1942 1058 


 


Output Total 1400 1100 900


 


Balance 542 -42 -900


 


Intake:   


 


  IV Fluids 832 20 


 


    ABX - CEFAZOLIN 110  


 


    ABX - VANCOMYCIN 266  


 


      


 


    NS (0.9%) 20 20 


 


  IVPB  58 


 


    ABX - CEFAZOLIN  58 


 


  Oral 1110 980 


 


Output:   


 


  Urine 1400 1100 900


 


Other:   


 


  # Bowel Movements 0  








 Laboratory Results - last 24 hr











  11/09/18 11/09/18





  05:30 05:30


 


WBC   6.4


 


RBC   3.28 L


 


Hgb   10.1 L


 


Hct   29 L


 


MCV   90


 


MCH   31


 


MCHC   34


 


RDW   13


 


Plt Count   222


 


MPV   7.4


 


ESR   41 H


 


Sodium  139 


 


Potassium  4.0 


 


Chloride  106 


 


Carbon Dioxide  29 


 


Anion Gap  4 


 


BUN  7 


 


Creatinine  0.64 


 


Est GFR ( Amer)  117.9 


 


Est GFR (Non-Af Amer)  97.4 


 


BUN/Creatinine Ratio  10.9 


 


Glucose  115 H 


 


Calcium  8.8 


 


Phosphorus  3.1 


 


Magnesium  1.7 L 


 


Total Bilirubin  0.30 


 


AST  15 


 


ALT  15 


 


Alkaline Phosphatase  44 


 


C-Reactive Protein  74.51 H 


 


Total Protein  6.0 L 


 


Albumin  3.3 


 


Globulin  2.7 


 


Albumin/Globulin Ratio  1.2 








Left knee ACE dry and intact


calf NT and soft


+ DF/PF left ankle


sensation intact distally


PICC line in LUE








Assessment:


[]Infected left knee s/p arthroscopic washout POD #2








Plan:


[]WBAT LLE


  Cefazolin 2g q 8 hrs


  Probable repeat washout left knee 11/10

## 2018-11-09 NOTE — PN
Subjective


Date of Service: 11/09/18


Interval History: 





Pt seen and examined.  Meds and labs reviewed.





CC:  N/A





ROS:  Denied HA/dizziness, F/C, N/V, CP, SOB, increased cough, sputum production

, abd pain, diarrhea, constipation, dysuria, myalgias, arthralgias, throat pain

, and new skin lesions.  The rest of the 14 point ROS are unremarkable.





PHYSICAL EXAM:


GEN APPEARANCE: Awake, not in acute distress


HEENT: NC/AT, PERRLA, moist oral mucosa, (-) throat erythema


NECK: Soft, supple, (-) cervical LAD, (-)JVD


HEART: S1S2 WNL, RRR, No MRG


CHEST: CTA, BL, GAE, No W/R/R


ABD: Soft, ND/NT, NABS 4x Q


EXT: No C/C/LLE (+)knee, cdi


SKIN: Warm to touch


PSYCH: No active psychosis, hallucinations, depression, SI/HI





Objective


Active Medications: 








Acetaminophen (Tylenol Tab*)  975 mg PO Q8H PRN


   PRN Reason: FEVER/PAIN


   Last Admin: 11/09/18 12:18 Dose:  975 mg


Atenolol (Tenormin Tab*)  25 mg PO QAM [facility]


   Last Admin: 11/09/18 09:38 Dose:  25 mg


Citalopram Hydrobromide (Celexa Tab*)  40 mg PO QAM [facility]


   Last Admin: 11/09/18 09:38 Dose:  40 mg


Diphenhydramine HCl (Benadryl Iv*)  12.5 mg IV Q6H PRN


   PRN Reason: PRURITIS


Diphenhydramine HCl (Benadryl Po*)  25 mg PO Q6H PRN


   PRN Reason: itching


Docusate Sodium (Colace Cap*)  100 mg PO BID PRN


   PRN Reason: CONSTIPATION


   Last Admin: 11/08/18 18:38 Dose:  100 mg


Enoxaparin Sodium (Lovenox(*))  40 mg SUBCUT DAILY@1200 [facility]


   Last Admin: 11/09/18 12:17 Dose:  40 mg


Heparin Sodium (Porcine) (Heparin Flush Picc/Ml/Cvc(*))  1 - 3 ml FLUSH 0600,

1800 [facility]; Protocol


   Last Admin: 11/09/18 05:34 Dose:  1 ml


Lactated Ringer's (Lactated Ringers 1000 Ml Bag*)  1,000 mls @ 100 mls/hr IV 

PER RATE [facility]


   Last Admin: 11/07/18 14:27 Dose:  100 mls/hr


Lactated Ringer's (Lactated Ringers 1000 Ml Bag*)  1,000 mls @ 125 mls/hr IV 

PER RATE [facility]


Cefazolin Sodium/Dextrose (Kefzol 2 Gm Premix In Ors(*))  2 gm in 50 mls @ 100 

mls/hr IVPB Q8H [facility]


   Last Admin: 11/09/18 12:17 Dose:  100 mls/hr


Magnesium Hydroxide (Milk Of Magnesia Liq*)  30 ml PO Q6H PRN


   PRN Reason: constipation


Melatonin (Melatonin)  3 mg PO BEDTIME PRN


   PRN Reason: INSOMNIA


   Last Admin: 11/08/18 00:20 Dose:  3 mg


Morphine Sulfate (Morphine Vial*)  2 mg IV Q4H PRN


   PRN Reason: PAIN - UNRELIEVED


   Last Admin: 11/09/18 16:07 Dose:  2 mg


Morphine Sulfate (Ms Contin(*))  15 mg PO Q12H [facility]


   Last Admin: 11/09/18 09:37 Dose:  15 mg


Oxycodone/Acetaminophen (Percocet 5/325 Tab*)  1 tab PO Q4H PRN


   PRN Reason: PAIN - MODERATE


   Last Admin: 11/09/18 15:55 Dose:  1 tab


Tramadol HCl (Ultram*)  50 mg PO Q6H PRN


   PRN Reason: PAIN - MODERATE


   Last Admin: 11/08/18 21:32 Dose:  50 mg








 Vital Signs - 8 hr











  11/09/18 11/09/18 11/09/18





  09:37 11:13 15:47


 


Temperature  98.7 F 98.2 F


 


Pulse Rate  85 73


 


Respiratory 18 16 17





Rate   


 


Blood Pressure  112/54 106/71





(mmHg)   


 


O2 Sat by Pulse  97 98





Oximetry   














  11/09/18 11/09/18





  15:55 16:07


 


Temperature  


 


Pulse Rate  


 


Respiratory 18 18





Rate  


 


Blood Pressure  





(mmHg)  


 


O2 Sat by Pulse  





Oximetry  











Oxygen Devices in Use Now: None


Result Diagrams: 


 11/09/18 05:30





 11/09/18 05:30


Microbiology and Other Data: 


 Microbiology











 11/07/18 17:54 Gram Stain - Final





 Knee Left Wound Culture - Preliminary





    No Growth Day 1


 


 11/07/18 17:54 Anaerobic Culture - Preliminary





 Body Fluid - Knee Left 














Assess/Plan/Problems-Billing


Assessment: 











- Patient Problems


(1) Infection of left knee


Current Visit: No   Status: Acute   Code(s): M00.9 - PYOGENIC ARTHRITIS, 

UNSPECIFIED   SNOMED Code(s): 973234331


   Comment: 


-S/P recent arthroscopy with partial medial meniscectomy on 10/11/18


-S/P Arthrocentesis on 11/07, w/wound PCR showing MSSA


-Continue Cefazolin 2g IV q8H, Abx day#2/28


-D/W Dr Lal


-For possible repeat washout tomorrow per ortho    





(2) HTN (hypertension)


Current Visit: Yes   Status: Acute   Code(s): I10 - ESSENTIAL (PRIMARY) 

HYPERTENSION   SNOMED Code(s): 16491217


   Comment: 


-Well-controlled


-Continue Atenolol   





(3) Anxiety


Current Visit: Yes   Status: Acute   Code(s): F41.9 - ANXIETY DISORDER, 

UNSPECIFIED   SNOMED Code(s): 68594454


   Comment: 


-Stablecontinue Citalopram   





(4) DVT prophylaxis


Current Visit: Yes   Status: Acute   Code(s): ZPV9396 -    SNOMED Code(s): 

860529452


   Comment: 


-Continue Lovenox   


Status and Disposition: 





-Defer with orthopedic team

## 2018-11-10 PROCEDURE — 3E1U38Z IRRIGATION OF JOINTS USING IRRIGATING SUBSTANCE, PERCUTANEOUS APPROACH: ICD-10-PCS | Performed by: ORTHOPAEDIC SURGERY

## 2018-11-10 RX ADMIN — MORPHINE SULFATE SCH MG: 15 TABLET, EXTENDED RELEASE ORAL at 10:00

## 2018-11-10 RX ADMIN — CEFAZOLIN SODIUM SCH MLS/HR: 2 SOLUTION INTRAVENOUS at 03:55

## 2018-11-10 RX ADMIN — ENOXAPARIN SODIUM SCH MG: 40 INJECTION SUBCUTANEOUS at 12:35

## 2018-11-10 RX ADMIN — Medication SCH ML: at 11:01

## 2018-11-10 RX ADMIN — HYDROMORPHONE HYDROCHLORIDE PRN MG: 1 INJECTION, SOLUTION INTRAMUSCULAR; INTRAVENOUS; SUBCUTANEOUS at 09:14

## 2018-11-10 RX ADMIN — Medication SCH ML: at 20:09

## 2018-11-10 RX ADMIN — OXYCODONE HYDROCHLORIDE AND ACETAMINOPHEN PRN TAB: 5; 325 TABLET ORAL at 03:59

## 2018-11-10 RX ADMIN — CEFAZOLIN SODIUM SCH MLS/HR: 2 SOLUTION INTRAVENOUS at 10:05

## 2018-11-10 RX ADMIN — OXYCODONE HYDROCHLORIDE AND ACETAMINOPHEN PRN TAB: 5; 325 TABLET ORAL at 00:05

## 2018-11-10 RX ADMIN — MORPHINE SULFATE SCH MG: 15 TABLET, EXTENDED RELEASE ORAL at 20:08

## 2018-11-10 RX ADMIN — CITALOPRAM SCH MG: 40 TABLET ORAL at 10:00

## 2018-11-10 RX ADMIN — ATENOLOL SCH: 25 TABLET ORAL at 09:59

## 2018-11-10 RX ADMIN — MORPHINE SULFATE PRN MG: 4 INJECTION INTRAVENOUS at 00:06

## 2018-11-10 RX ADMIN — Medication SCH: at 05:26

## 2018-11-10 RX ADMIN — CEFAZOLIN SODIUM SCH MLS/HR: 2 SOLUTION INTRAVENOUS at 17:46

## 2018-11-10 RX ADMIN — OXYCODONE HYDROCHLORIDE AND ACETAMINOPHEN PRN TAB: 5; 325 TABLET ORAL at 16:36

## 2018-11-10 RX ADMIN — OXYCODONE HYDROCHLORIDE AND ACETAMINOPHEN PRN TAB: 5; 325 TABLET ORAL at 11:47

## 2018-11-10 RX ADMIN — HYDROMORPHONE HYDROCHLORIDE PRN MG: 1 INJECTION, SOLUTION INTRAMUSCULAR; INTRAVENOUS; SUBCUTANEOUS at 09:07

## 2018-11-10 RX ADMIN — MORPHINE SULFATE PRN MG: 4 INJECTION INTRAVENOUS at 05:22

## 2018-11-10 NOTE — PN
Subjective


Date of Service: 11/10/18


Interval History: 





Pt seen and examined.  Meds and labs reviewed.  Pt S/P repeat wash out of L. 

knee





CC:  N/A





ROS:  Denied HA/dizziness, F/C, N/V, CP, SOB, increased cough, sputum production

, abd pain, diarrhea, constipation, dysuria, myalgias, arthralgias, throat pain

, and new skin lesions.  The rest of the 14 point ROS are unremarkable.





PHYSICAL EXAM:


GEN APPEARANCE: Awake, not in acute distress


HEENT: NC/AT, PERRLA, moist oral mucosa, (-) throat erythema


NECK: Soft, supple, (-) cervical LAD, (-)JVD


HEART: S1S2 WNL, RRR, No MRG


CHEST: CTA, BL, GAE, No W/R/R


ABD: Soft, ND/NT, NABS 4x Q


EXT: No C/C/LLE (+)knee, cdi


SKIN: Warm to touch


PSYCH: No active psychosis, hallucinations, depression, SI/HI





Objective


Active Medications: 








Acetaminophen (Tylenol Tab*)  975 mg PO Q8H PRN


   PRN Reason: FEVER/PAIN


   Last Admin: 11/09/18 12:18 Dose:  975 mg


Atenolol (Tenormin Tab*)  25 mg PO QAM [facility]


   Last Admin: 11/10/18 09:59 Dose:  Not Given


Citalopram Hydrobromide (Celexa Tab*)  40 mg PO QAM [facility]


   Last Admin: 11/10/18 10:00 Dose:  40 mg


Diphenhydramine HCl (Benadryl Iv*)  12.5 mg IV Q6H PRN


   PRN Reason: PRURITIS


Diphenhydramine HCl (Benadryl Po*)  25 mg PO Q6H PRN


   PRN Reason: itching


Docusate Sodium (Colace Cap*)  100 mg PO BID PRN


   PRN Reason: CONSTIPATION


   Last Admin: 11/08/18 18:38 Dose:  100 mg


Enoxaparin Sodium (Lovenox(*))  40 mg SUBCUT DAILY@1200 [facility]


   Last Admin: 11/10/18 12:35 Dose:  40 mg


Heparin Sodium (Porcine) (Heparin Flush Picc/Ml/Cvc(*))  1 - 3 ml FLUSH 0600,

1800 [facility]; Protocol


   Last Admin: 11/10/18 11:01 Dose:  1 ml


Lactated Ringer's (Lactated Ringers 1000 Ml Bag*)  1,000 mls @ 100 mls/hr IV 

PER RATE [facility]


   Last Admin: 11/07/18 14:27 Dose:  100 mls/hr


Cefazolin Sodium/Dextrose (Kefzol 2 Gm Premix In Ors(*))  2 gm in 50 mls @ 100 

mls/hr IVPB Q8H [facility]


   Last Admin: 11/10/18 10:05 Dose:  100 mls/hr


Magnesium Hydroxide (Milk Of Magnesia Liq*)  30 ml PO Q6H PRN


   PRN Reason: constipation


Melatonin (Melatonin)  3 mg PO BEDTIME PRN


   PRN Reason: INSOMNIA


   Last Admin: 11/08/18 00:20 Dose:  3 mg


Morphine Sulfate (Morphine Vial*)  2 mg IV Q4H PRN


   PRN Reason: PAIN - UNRELIEVED


   Last Admin: 11/10/18 05:22 Dose:  2 mg


Morphine Sulfate (Ms Contin(*))  15 mg PO Q12H [facility]


   Last Admin: 11/10/18 10:00 Dose:  15 mg


Oxycodone/Acetaminophen (Percocet 5/325 Tab*)  1 tab PO Q4H PRN


   PRN Reason: PAIN - MODERATE


   Last Admin: 11/10/18 11:47 Dose:  1 tab








 Vital Signs - 8 hr











  11/10/18 11/10/18 11/10/18





  08:27 08:28 08:30


 


Temperature  98.6 F 


 


Pulse Rate 73 76 76


 


Respiratory  16 19





Rate   


 


Blood Pressure  109/73 103/70





(mmHg)   


 


O2 Sat by Pulse 96 92 99





Oximetry   














  11/10/18 11/10/18 11/10/18





  08:35 08:40 08:45


 


Temperature   


 


Pulse Rate 75 73 70


 


Respiratory 15 18 14





Rate   


 


Blood Pressure 124/72 126/77 130/79





(mmHg)   


 


O2 Sat by Pulse 98 99 100





Oximetry   














  11/10/18 11/10/18 11/10/18





  08:50 08:55 09:00


 


Temperature   


 


Pulse Rate 70 74 72


 


Respiratory 13 17 17





Rate   


 


Blood Pressure 129/84 139/91 121/85





(mmHg)   


 


O2 Sat by Pulse 99 100 100





Oximetry   














  11/10/18 11/10/18 11/10/18





  09:01 09:05 09:07


 


Temperature   


 


Pulse Rate 70 70 


 


Respiratory 13 11 16





Rate   


 


Blood Pressure  130/78 





(mmHg)   


 


O2 Sat by Pulse 100 100 





Oximetry   














  11/10/18 11/10/18 11/10/18





  09:14 09:15 09:42


 


Temperature   97.8 F


 


Pulse Rate  69 70


 


Respiratory 16 16 18





Rate   


 


Blood Pressure  117/83 97/69





(mmHg)   


 


O2 Sat by Pulse  99 95





Oximetry   














  11/10/18 11/10/18 11/10/18





  09:47 10:00 10:42


 


Temperature   99.0 F


 


Pulse Rate   73


 


Respiratory 18 18 





Rate   


 


Blood Pressure   107/69





(mmHg)   


 


O2 Sat by Pulse   95





Oximetry   














  11/10/18 11/10/18 11/10/18





  11:43 11:47 12:37


 


Temperature 99.0 F  


 


Pulse Rate 72  


 


Respiratory 16 16 18





Rate   


 


Blood Pressure 114/77  





(mmHg)   


 


O2 Sat by Pulse 97  





Oximetry   














  11/10/18





  13:54


 


Temperature 


 


Pulse Rate 


 


Respiratory 18





Rate 


 


Blood Pressure 





(mmHg) 


 


O2 Sat by Pulse 





Oximetry 











Oxygen Devices in Use Now: None


Result Diagrams: 


 11/09/18 05:30





 11/09/18 05:30


Microbiology and Other Data: 


 Microbiology











 11/07/18 17:54 Gram Stain - Final





 Knee Left Wound Culture - Preliminary





    No Growth Day 1


 


 11/07/18 17:54 Anaerobic Culture - Preliminary





 Body Fluid - Knee Left 














Assess/Plan/Problems-Billing


Assessment: 











- Patient Problems


(1) Infection of left knee


Current Visit: No   Status: Acute   Code(s): M00.9 - PYOGENIC ARTHRITIS, 

UNSPECIFIED   SNOMED Code(s): 218592824


   Comment: 


-S/P repeat washout today 11/10/18


-S/P recent arthroscopy with partial medial meniscectomy on 10/11/18


-S/P Arthrocentesis on 11/07, w/wound PCR showing MSSA


-Continue Cefazolin 2g IV q8H, Abx day#2/28


-D/W Dr Lal   





(2) HTN (hypertension)


Current Visit: Yes   Status: Acute   Code(s): I10 - ESSENTIAL (PRIMARY) 

HYPERTENSION   SNOMED Code(s): 76875977


   Comment: 


-Well-controlled


-Continue Atenolol   





(3) Anxiety


Current Visit: Yes   Status: Acute   Code(s): F41.9 - ANXIETY DISORDER, 

UNSPECIFIED   SNOMED Code(s): 82637048


   Comment: 


-Stablecontinue Citalopram   





(4) DVT prophylaxis


Current Visit: Yes   Status: Acute   Code(s): [id number] -    SNOMED Code(s): 

613409778


   Comment: 


-Continue Lovenox   


Status and Disposition: 





-Defer with orthopedic team

## 2018-11-10 NOTE — OP
DATE OF OPERATION:  11/10/18 - ROOM #335

 

DATE OF BIRTH:  [birth year]

 

ATTENDING SURGEON:  Tori Arreola MD

 

ANESTHESIOLOGIST:  Dr. Caraballo.

 

ANESTHESIA:  General.

 

PRE-OP DIAGNOSIS:  Left knee joint infection.

 

POST-OP DIAGNOSIS:  Left knee joint infection.

 

OPERATIVE PROCEDURE:  Left knee arthroscopic irrigation and debridement.

 

ESTIMATED BLOOD LOSS:  Less than 50 cc.

 

COMPLICATIONS:  None.

 

SPECIMENS:  None.

 

BRIEF HISTORY/INDICATION:  Ms. Mehta is a female who had, 10/11/18, left knee 
arthroscopy with partial medial meniscectomy.  She did well initially, but then 
developed the acute onset of pain and was seen in clinic within 24 hours on 11/
07/18.  Aspiration of her knee joint yielded purulent fluid and she was 
directly admitted to [facility] for washout of the knee joint.  On 11
/07/18, an urgent arthroscopic irrigation and debridement of knee joint was 
performed.  Cultures have shown Staph aureus growth that is sensitive to all 
antibiotics.  The patient's dressings were changed on postop day 2, and 
although the incisions looked good, she continued to have severe pain and 
difficulty bearing weight.  Decision was made to perform a second washout of 
the knee joint.  Informed consent was obtained from the patient and she wished 
to proceed.

 

INTRAOPERATIVE FINDINGS:  Intraoperatively, there was no further breakdown of 
the cartilage.  No obvious devitalized tissue.  There was some cloudy joint 
fluid encountered upon entrance, fluids were used to wash the joint.

 

DESCRIPTION OF PROCEDURE:  Ms. Mehta was identified in the preanesthesia unit.  
Her left lower extremity was marked as the correct operative side.  Informed 
consent was signed and placed in the chart.  The patient was taken to the 
operating room and placed under general anesthesia.  Left lower extremity was 
prepped and draped in the usual sterile fashion.  Preop time-out was made to 
correctly identify the patient's side and site.  Appropriate antibiotics were 
already on board through her IV.

 

The patient's 2 incisions had sutures removed.  Lateral portal incision was 
carefully opened.  Trocar was removed.  Through this, light and water sources 
turned on, there was some cloudy fluid visible.  6 L of sterile saline were 
used to irrigate the knee joint.  There was no further cloudiness of the joint 
fluid.  A tour of the knee joint was performed.  Any bleeding was coagulated 
with radiofrequency ablation wand.  There was no visible devitalized tissue.  
There was no visible cartilage damage since the last scope.

 

Another 6 L of sterile saline were used to further irrigate the knee joint.  
All instruments were removed.  The incisions were closed using interrupted 3-0 
nylon suture.  The patient's incisions were covered with Xeroform, 4x4s, and 
Webril.  Ace wrap and cold pack were placed on top.  The patient's anesthesia 
was reversed without difficulty.  She was taken to the PACU in stable 
condition.  Intended weightbearing will be weightbearing as tolerated.  
Intended DVT prophylaxis will be Lovenox.

 

She has sensitive Staph aureus and she is being treated with IV antibiotics 
followed by Dr. Lal of Infectious Disease.  She has a PICC line.

 

 [id number]/CPS #: [id number]

MTDD

## 2018-11-11 RX ADMIN — OXYCODONE HYDROCHLORIDE AND ACETAMINOPHEN PRN TAB: 5; 325 TABLET ORAL at 23:01

## 2018-11-11 RX ADMIN — ATENOLOL SCH MG: 25 TABLET ORAL at 08:18

## 2018-11-11 RX ADMIN — CEFAZOLIN SODIUM SCH MLS/HR: 2 SOLUTION INTRAVENOUS at 10:18

## 2018-11-11 RX ADMIN — Medication SCH ML: at 05:04

## 2018-11-11 RX ADMIN — OXYCODONE HYDROCHLORIDE AND ACETAMINOPHEN PRN TAB: 5; 325 TABLET ORAL at 00:40

## 2018-11-11 RX ADMIN — ENOXAPARIN SODIUM SCH MG: 40 INJECTION SUBCUTANEOUS at 12:22

## 2018-11-11 RX ADMIN — MORPHINE SULFATE SCH MG: 15 TABLET, EXTENDED RELEASE ORAL at 19:37

## 2018-11-11 RX ADMIN — OXYCODONE HYDROCHLORIDE AND ACETAMINOPHEN PRN TAB: 5; 325 TABLET ORAL at 14:22

## 2018-11-11 RX ADMIN — CEFAZOLIN SODIUM SCH MLS/HR: 2 SOLUTION INTRAVENOUS at 17:52

## 2018-11-11 RX ADMIN — MORPHINE SULFATE SCH MG: 15 TABLET, EXTENDED RELEASE ORAL at 08:18

## 2018-11-11 RX ADMIN — Medication SCH ML: at 18:32

## 2018-11-11 RX ADMIN — CITALOPRAM SCH MG: 40 TABLET ORAL at 08:18

## 2018-11-11 RX ADMIN — CEFAZOLIN SODIUM SCH MLS/HR: 2 SOLUTION INTRAVENOUS at 02:05

## 2018-11-11 RX ADMIN — Medication SCH ML: at 02:59

## 2018-11-11 NOTE — PN
Subjective


Date of Service: 11/11/18


Interval History: 





Pt seen and examined.  Meds and labs reviewed.  Pt S/P repeat wash out of L. 

knee





CC:  N/A





ROS:  Denied HA/dizziness, F/C, N/V, CP, SOB, increased cough, sputum production

, abd pain, diarrhea, constipation, dysuria, myalgias, arthralgias, throat pain

, and new skin lesions.  The rest of the 14 point ROS are unremarkable.





PHYSICAL EXAM:


GEN APPEARANCE: Awake, not in acute distress


HEENT: NC/AT, PERRLA, moist oral mucosa, (-) throat erythema


NECK: Soft, supple, (-) cervical LAD, (-)JVD


HEART: S1S2 WNL, RRR, No MRG


CHEST: CTA, BL, GAE, No W/R/R


ABD: Soft, ND/NT, NABS 4x Q


EXT: No C/C/LLE (+)knee, cdi, PICC line on RUE


SKIN: Warm to touch


PSYCH: No active psychosis, hallucinations, depression, SI/HI





Objective


Active Medications: 








Acetaminophen (Tylenol Tab*)  975 mg PO Q8H PRN


   PRN Reason: FEVER/PAIN


   Last Admin: 11/09/18 12:18 Dose:  975 mg


Atenolol ([facility]ormin Tab*)  [facility]


   Last Admin: 11/11/18 08:18 Dose:  25 mg


Citalopram Hydrobromide (Celexa Tab*)  40 mg PO QAM [facility]


   Last Admin: 11/11/18 08:18 Dose:  40 mg


Diphenhydramine HCl (Benadryl Iv*)  12.5 mg IV Q6H PRN


   PRN Reason: PRURITIS


Diphenhydramine HCl (Benadryl Po*)  25 mg PO Q6H PRN


   PRN Reason: itching


Docusate Sodium (Colace Cap*)  100 mg PO BID PRN


   PRN Reason: CONSTIPATION


   Last Admin: 11/08/18 18:38 Dose:  100 mg


Enoxaparin Sodium (Lovenox(*))  40 mg SUBCUT DAILY@1200 ROMMEL


   Last Admin: 11/11/18 12:22 Dose:  40 mg


Heparin Sodium (Porcine) (Heparin Flush Picc/Ml/Cvc(*))  1 - 3 ml FLUSH 0600,

1800 ROMMEL; Protocol


   Last Admin: 11/11/18 05:04 Dose:  1 ml


Lactated Ringer's (Lactated Ringers 1000 Ml Bag*)  1,000 mls @ 100 mls/hr IV 

PER RATE [facility]


   Last Admin: 11/07/18 14:27 Dose:  100 mls/hr


Cefazolin Sodium/Dextrose (Kefzol 2 Gm Premix In Ors(*))  2 gm in 50 mls @ 100 

mls/hr IVPB Q8H [facility]


   Last Admin: 11/11/18 10:18 Dose:  100 mls/hr


Magnesium Hydroxide (Milk Of Magnesia Liq*)  30 ml PO Q6H PRN


   PRN Reason: constipation


Melatonin (Melatonin)  3 mg PO BEDTIME PRN


   PRN Reason: INSOMNIA


   Last Admin: 11/11/18 00:41 Dose:  3 mg


Morphine Sulfate (Morphine Vial*)  2 mg IV Q4H PRN


   PRN Reason: PAIN - UNRELIEVED


   Last Admin: 11/10/18 05:22 Dose:  2 mg


Morphine Sulfate (Ms Contin(*))  15 mg PO Q12H [facility]


   Last Admin: 11/11/18 08:18 Dose:  15 mg


Oxycodone/Acetaminophen (Percocet 5/325 Tab*)  1 tab PO Q4H PRN


   PRN Reason: PAIN - MODERATE


   Last Admin: 11/11/18 00:40 Dose:  1 tab








 Vital Signs - 8 hr











  11/11/18 11/11/18 11/11/18





  07:48 08:00 08:18


 


Temperature 98.2 F  


 


Pulse Rate 80  


 


Respiratory 16 18 18





Rate   


 


Blood Pressure 112/69  





(mmHg)   


 


O2 Sat by Pulse 96 96 





Oximetry   














  11/11/18 11/11/18





  10:45 11:37


 


Temperature  97.9 F


 


Pulse Rate  71


 


Respiratory 18 18





Rate  


 


Blood Pressure  126/90





(mmHg)  


 


O2 Sat by Pulse  100





Oximetry  











Oxygen Devices in Use Now: None


Result Diagrams: 


 11/09/18 05:30





 11/09/18 05:30


Microbiology and Other Data: 


 Microbiology











 11/07/18 17:54 Gram Stain - Final





 Knee Left Wound Culture - Preliminary





    No Growth Day 1


 


 11/07/18 17:54 Anaerobic Culture - Preliminary





 Body Fluid - Knee Left 














Assess/Plan/Problems-Billing


Assessment: 











- Patient Problems


(1) Infection of left knee


Current Visit: No   Status: Acute   Code(s): M00.9 - PYOGENIC ARTHRITIS, 

UNSPECIFIED   SNOMED Code(s): 688432546


   Comment: 


-S/P repeat washout on 11/10/18


-S/P recent arthroscopy with partial medial meniscectomy on 10/11/18


-S/P Arthrocentesis on 11/07, w/wound PCR showing MSSA


-Continue Cefazolin 2g IV q8H, Abx day#4/28


-D/W Dr Lal      





(2) HTN (hypertension)


Current Visit: Yes   Status: Acute   Code(s): I10 - ESSENTIAL (PRIMARY) 

HYPERTENSION   SNOMED Code(s): 12666824


   Comment: 


-Well-controlled


-Continue Atenolol   





(3) Anxiety


Current Visit: Yes   Status: Acute   Code(s): F41.9 - ANXIETY DISORDER, 

UNSPECIFIED   SNOMED Code(s): 99457729


   Comment: 


-Stablecontinue Citalopram   





(4) DVT prophylaxis


Current Visit: Yes   Status: Acute   Code(s): NBL0581 -    SNOMED Code(s): 

318070562


   Comment: 


-Continue Lovenox   


Status and Disposition: 





-For planned D/C in AM by ortho


-Will defer

## 2018-11-11 NOTE — PN
Progress Note





- Progress Note


Date of Service: 11/11/18


SOAP: 


Subjective:


POD #1 Left knee repeat arthroscopic I&D. Doing much better. Able to bear 

weight more comfortably and bend knee. Denies CP/SOB, f/c, n/v.








Objective:


Vitals: 











Temp Pulse Resp BP Pulse Ox


 


 97.7 F   65   18   131/72   97 


 


 11/11/18 03:32  11/11/18 03:32  11/11/18 03:32  11/11/18 03:32  11/11/18 03:32








Gen: A&Ox3, NAD at rest laying in bed


LLE: Dressing C/D/I, full extension and flexion to 90 deg pain free. +f/e at 

ankles and MTPs, N/V intact.








Assessment:


POD #1 Left knee repeat arthroscopic I&D





Plan:


Cont IV abx


Cont PT


D/C home tomorrow with PICC and IV abx course per ID

## 2018-11-12 VITALS — SYSTOLIC BLOOD PRESSURE: 129 MMHG | DIASTOLIC BLOOD PRESSURE: 78 MMHG

## 2018-11-12 LAB
BASOPHILS # BLD AUTO: 0.1 10^3/UL (ref 0–0.2)
EOSINOPHIL # BLD AUTO: 0.2 10^3/UL (ref 0–0.6)
HCT VFR BLD AUTO: 27 % (ref 35–47)
HGB BLD-MCNC: 9 G/DL (ref 12–16)
LYMPHOCYTES # BLD AUTO: 3.3 10^3/UL (ref 1–4.8)
MCH RBC QN AUTO: 30 PG (ref 27–31)
MCHC RBC AUTO-ENTMCNC: 34 G/DL (ref 31–36)
MCV RBC AUTO: 90 FL (ref 80–97)
MONOCYTES # BLD AUTO: 0.4 10^3/UL (ref 0–0.8)
NEUTROPHILS # BLD AUTO: 2.3 10^3/UL (ref 1.5–7.7)
NRBC # BLD AUTO: 0 10^3/UL
NRBC BLD QL AUTO: 0
PLATELET # BLD AUTO: 274 10^3/UL (ref 150–450)
RBC # BLD AUTO: 2.97 10^6/UL (ref 4–5.4)
WBC # BLD AUTO: 6.2 10^3/UL (ref 3.5–10.8)

## 2018-11-12 RX ADMIN — OXYCODONE HYDROCHLORIDE AND ACETAMINOPHEN PRN TAB: 5; 325 TABLET ORAL at 10:51

## 2018-11-12 RX ADMIN — Medication SCH ML: at 03:27

## 2018-11-12 RX ADMIN — CITALOPRAM SCH MG: 40 TABLET ORAL at 07:54

## 2018-11-12 RX ADMIN — ATENOLOL SCH MG: 25 TABLET ORAL at 07:54

## 2018-11-12 RX ADMIN — OXYCODONE HYDROCHLORIDE AND ACETAMINOPHEN PRN TAB: 5; 325 TABLET ORAL at 03:25

## 2018-11-12 RX ADMIN — CEFAZOLIN SODIUM SCH MLS/HR: 2 SOLUTION INTRAVENOUS at 09:16

## 2018-11-12 RX ADMIN — MORPHINE SULFATE SCH MG: 15 TABLET, EXTENDED RELEASE ORAL at 07:54

## 2018-11-12 RX ADMIN — ENOXAPARIN SODIUM SCH MG: 40 INJECTION SUBCUTANEOUS at 10:50

## 2018-11-12 RX ADMIN — Medication SCH ML: at 05:06

## 2018-11-12 RX ADMIN — CEFAZOLIN SODIUM SCH MLS/HR: 2 SOLUTION INTRAVENOUS at 02:20

## 2018-11-12 NOTE — DS
DISCHARGE SUMMARY:

 

DATE OF ADMISSION:  11/07/18

 

DATE OF DISCHARGE:  11/12/18

 

ATTENDING PHYSICIAN:  Dr. Tori Arreola.* (DICTATED BY MARY MCKINNEY)

 

ADMISSION DIAGNOSIS:  Left knee septic arthritis.

 

DISCHARGE DIAGNOSIS:  Left knee septic arthritis.

 

SURGERY PERFORMED:  Arthroscopy irrigation and debridement, left knee x2.

 

HOSPITAL COURSE:  The patient is a 53-year-old female who underwent left knee 
arthroscopy, partial medial meniscectomy, and chondroplasty on 10/11/18.  The 
patient initially had done well, but developed increased pain with some 
drainage from her portals and was seen in the office by Dr. Arreola on the 11/07/
18, where purulent material was aspirated from her knee.  She was admitted 
directly to [facility] and was taken to the operating room on the 
evening of 11/07/18 for her first wash out of the left knee joint.  She 
tolerated the procedure well and improved slightly, but then her pain continued 
with throbbing in the knee.  It was felt additional irrigation and debridement 
would be necessary and she was taken again for I and D arthroscopic wash out on 
Saturday, 11/10/18.  She improved significantly thereafter.  She was also seen 
by Infectious Disease, Dr. Lal. She was found to grow out MSSA and is on 
cephazolin 2 g q.8 hours.  It was felt that she was medically and 
orthopedically stable for discharge to home with PICC line placed for continued 
cephazolin IV antibiotic treatment.

 

CONDITION ON DISCHARGE:  She was afebrile.  Her vital signs were stable.  Her 
knee swelling is improving, pain much improved.  Calf soft and nontender.  Her 
neurovascular status is intact with active dorsiflexion of the left ankle.  
Motion of the knee is much less painful today.

 

PLAN:  Discharge to home.  She may bear weight as tolerated on the left lower 
extremity.  She will continue on cephazolin 2 g q.8 hours as instructed by Dr. Lal.  She will go home on 325 mg of aspirin p.o. b.i.d. for DVT 
prophylaxis. She was  provided with a prescription of MS-Contin 15 mg 1 p.o. 
b.i.d. as needed for pain, #14, 0 refills.  She will followup in the office 
next Monday with Dr. Arreola in the office.  If she has increased pain, increased 
swelling, drainage, fever, or chills, the office will be contacted prior to 
scheduled appointment next Monday.

 

____________________________________ MARY MCKINNEY

 

[id number]/CPS #: [id number]

FANI

## 2018-11-12 NOTE — PN
Progress Note





- Progress Note


Date of Service: 11/12/18


SOAP: 


Subjective:


[]Patient seen at bedside , doing well.  Pain much improved.  She feels the 

long acting morphine works better for her than Percocet.  Ready to go home with 

IV Cefazolin.








Objective:


[]


 Vital Signs











Temp  98.6 F   11/12/18 07:36


 


Pulse  69   11/12/18 07:36


 


Resp  18   11/12/18 07:54


 


BP  129/78   11/12/18 07:36


 


Pulse Ox  99   11/12/18 07:36








 Intake & Output











 11/11/18 11/12/18 11/12/18





 18:59 06:59 18:59


 


Intake Total 250 1055 


 


Output Total 675 1900 


 


Balance -425 -845 


 


Intake:   


 


  IV Fluids  55 


 


    ABX - CEFAZOLIN  55 


 


  Oral 250 1000 


 


Output:   


 


  Urine 675 1900 








 Laboratory Results - last 24 hr











  11/12/18 11/12/18





  05:06 05:06


 


WBC   6.2


 


RBC   2.97 L


 


Hgb   9.0 L


 


Hct   27 L


 


MCV   90


 


MCH   30


 


MCHC   34


 


RDW   13


 


Plt Count   274


 


MPV   7.5


 


Neut % (Auto)   36.5 L


 


Lymph % (Auto)   53.8 H


 


Mono % (Auto)   6.0


 


Eos % (Auto)   2.9


 


Baso % (Auto)   0.8


 


Absolute Neuts (auto)   2.3


 


Absolute Lymphs (auto)   3.3


 


Absolute Monos (auto)   0.4


 


Absolute Eos (auto)   0.2


 


Absolute Basos (auto)   0.1


 


Absolute Nucleated RBC   0


 


Nucleated RBC %   0


 


Sodium  140 


 


Potassium  4.2 


 


Chloride  107 


 


Carbon Dioxide  30 


 


Anion Gap  3 


 


BUN  15 


 


Creatinine  0.66 


 


Est GFR ( Amer)  113.8 


 


Est GFR (Non-Af Amer)  94.0 


 


BUN/Creatinine Ratio  22.7 H 


 


Glucose  100 


 


Calcium  8.8 


 


Phosphorus  3.5 


 


Magnesium  2.0 








Dressings changed by Dr. Arreola this am- wounds benign


calf NT and soft


+DF/PF left ankle


sensation intact distally








Assessment:


[]s/p arthroscopic washout, knee infection, X2 left knee 








Plan:


[]WBAT LLE


  Home today on IV Cefazolin q8, long acting morphine


  Follow up in 1 week with Dr. Arreola.

## 2018-12-21 ENCOUNTER — HOSPITAL ENCOUNTER (EMERGENCY)
Dept: HOSPITAL 25 - ED | Age: 53
Discharge: HOME | End: 2018-12-21
Payer: COMMERCIAL

## 2018-12-21 VITALS — DIASTOLIC BLOOD PRESSURE: 92 MMHG | SYSTOLIC BLOOD PRESSURE: 148 MMHG

## 2018-12-21 DIAGNOSIS — H60.91: Primary | ICD-10-CM

## 2018-12-21 DIAGNOSIS — I10: ICD-10-CM

## 2018-12-21 DIAGNOSIS — Z88.0: ICD-10-CM

## 2018-12-21 DIAGNOSIS — E78.00: ICD-10-CM

## 2018-12-21 DIAGNOSIS — Z72.0: ICD-10-CM

## 2018-12-21 DIAGNOSIS — F32.9: ICD-10-CM

## 2018-12-21 DIAGNOSIS — F41.9: ICD-10-CM

## 2018-12-21 LAB
ALBUMIN SERPL BCG-MCNC: 4.1 G/DL (ref 3.2–5.2)
ALBUMIN/GLOB SERPL: 1.5 {RATIO} (ref 1–3)
ALP SERPL-CCNC: 66 U/L (ref 34–104)
ALT SERPL W P-5'-P-CCNC: 11 U/L (ref 7–52)
ANION GAP SERPL CALC-SCNC: 4 MMOL/L (ref 2–11)
AST SERPL-CCNC: 15 U/L (ref 13–39)
BASOPHILS # BLD AUTO: 0.1 10^3/UL (ref 0–0.2)
BUN SERPL-MCNC: 10 MG/DL (ref 6–24)
BUN/CREAT SERPL: 16.4 (ref 8–20)
CALCIUM SERPL-MCNC: 9.4 MG/DL (ref 8.6–10.3)
CHLORIDE SERPL-SCNC: 107 MMOL/L (ref 101–111)
EOSINOPHIL # BLD AUTO: 0.3 10^3/UL (ref 0–0.6)
GLOBULIN SER CALC-MCNC: 2.8 G/DL (ref 2–4)
GLUCOSE SERPL-MCNC: 91 MG/DL (ref 70–100)
HCO3 SERPL-SCNC: 27 MMOL/L (ref 22–32)
HCT VFR BLD AUTO: 34 % (ref 35–47)
HGB BLD-MCNC: 11.5 G/DL (ref 12–16)
LYMPHOCYTES # BLD AUTO: 2.8 10^3/UL (ref 1–4.8)
MCH RBC QN AUTO: 30 PG (ref 27–31)
MCHC RBC AUTO-ENTMCNC: 34 G/DL (ref 31–36)
MCV RBC AUTO: 88 FL (ref 80–97)
MONOCYTES # BLD AUTO: 0.6 10^3/UL (ref 0–0.8)
NEUTROPHILS # BLD AUTO: 4.3 10^3/UL (ref 1.5–7.7)
NRBC # BLD AUTO: 0 10^3/UL
NRBC BLD QL AUTO: 0
PLATELET # BLD AUTO: 276 10^3/UL (ref 150–450)
POTASSIUM SERPL-SCNC: 3.9 MMOL/L (ref 3.5–5)
PROT SERPL-MCNC: 6.9 G/DL (ref 6.4–8.9)
RBC # BLD AUTO: 3.83 10^6/UL (ref 4–5.4)
SODIUM SERPL-SCNC: 138 MMOL/L (ref 135–145)
WBC # BLD AUTO: 8.1 10^3/UL (ref 3.5–10.8)

## 2018-12-21 PROCEDURE — 36415 COLL VENOUS BLD VENIPUNCTURE: CPT

## 2018-12-21 PROCEDURE — 85025 COMPLETE CBC W/AUTO DIFF WBC: CPT

## 2018-12-21 PROCEDURE — 80053 COMPREHEN METABOLIC PANEL: CPT

## 2018-12-21 PROCEDURE — 99282 EMERGENCY DEPT VISIT SF MDM: CPT

## 2018-12-21 PROCEDURE — 86140 C-REACTIVE PROTEIN: CPT

## 2018-12-21 PROCEDURE — 96372 THER/PROPH/DIAG INJ SC/IM: CPT

## 2018-12-21 RX ADMIN — CIPROFLOXACIN AND DEXAMETHASONE ONE DRP: 3; 1 SUSPENSION/ DROPS AURICULAR (OTIC) at 16:19

## 2018-12-21 RX ADMIN — KETOROLAC TROMETHAMINE ONE MG: 30 INJECTION, SOLUTION INTRAMUSCULAR at 16:16

## 2018-12-21 NOTE — XMS REPORT
Continuity of Care Document (CCD)

 Created on:2018



Patient:Starla Blount

Sex:Female

:1965

External Reference #:[id number]





Demographics







 Address  [address] [place] [address]

 

 Mobile Phone  [phone]

 

 Email Address  [email or web address]

 

 Preferred Language  en

 

 Marital Status  Not  or 

 

 Congregation Affiliation  Unknown

 

 Race  White

 

 Ethnic Group  Not  or 









Author







 Name  [place] Adriana









Support







 Name  Relationship  Address  Phone

 

 Jai Blount  Unavailable  Unavailable  [phone]









Care Team Providers







 Name  Role  Phone

 

 Pal Maciel NP  Primary Care Physician  Unavailable









Payers







 Type  Date  Identification Numbers  Payment Provider  Subscriber

 

     Policy Number: [id number]  [facility]  Starla Blount









 PayID: [id number]  [address]









 [place] [address]







Advance Directives







 Description

 

 No Information Available







Problems







 Date  Description  Provider  Status

 

 Onset: 08/15/2018  Localized, primary osteoarthritis  Tori Arreola M.D.  
Active

 

 Onset: 08/15/2018  Sprain of medial collateral ligament of  Tori Arreola M.D.
  Active



   knee    

 

 Onset: 2018  Acute meniscal tear, medial, posterior  Tori Arreola M.D.  
Active



   horn    







Family History







 Date  Family Member(s)  Problem(s)  Comments

 

   General  Hypertension  

 

   General  Cancer  







Social History







 Type  Date  Description  Comments

 

 Birth Sex    Unknown  

 

 Lives With    Spouse  

 

 Occupation    Nurse  

 

 ETOH Use    Occasionally consumes alcohol  

 

 Tobacco Use  Start: Unknown  Patient was a smoker, current  



     status is unknown  

 

 Smoking Status  Reviewed: 18  Patient was a smoker, current  



     status is unknown  

 

 Exercise Type/Frequency    Exercises sporadically  







Allergies, Adverse Reactions, Alerts







 Date  Description  Reaction  Status  Severity  Comments

 

 08/15/2018  Penicillin  respiratory childhood rxn  Active    







Medications







 Medication  Date  Status  Form  Strength  Qnty  SIG  Indications  Ordering



                 Provider

 

 Cefazolin    Active  Solution  1gm    2gm IV q    Collin D.



 Sodium  018    Rec      8 hrs x    Macqueen,



             28 days    M.D.



             through    



             [facility]    

 

 Percocet  10/10/2  Active  Tablets  5-325mg  42tabs  1 by    Toriamber Nickerson          mouth    Maxwell,



             every 4    M.D.



             hours as    



             needed    



             pain    

 

 Aspirin  10/10/2  Active  Tablets  325mg  28tabs  take 1 by    Tori



   018          mouth    Maxwell,



             twice a    M.D.



             day for    



             two weeks    

 

 Lexapro  0  Active  Tablets  20mg    1 by    Unknown



   000          mouth    



             every day    

 

 Vitamin D  0  Active            Unknown



   000              

 

 Atenolol    Active  Tablets  25mg        O'claudia,



   000              MD Klarissa

 

 Morphine    Active  Caps ER  Unknown    take one    Unknown



 Sulfate ER  000    24HR      tab by    



             mouth    



             every 12    



             hours as    



             needed.    

 

                 

 

 Meloxicam  08/15/2  Hx  Tablets  15mg  30tabs  1 by  M25.562  Tori



   018 -          mouth    Maxwell,



             every day    M.D.



   018              







Immunizations







 Description

 

 No Information Available







Vital Signs







 Date  Vital  Result  Comment

 

 2018  2:24pm  Height  61 inches  5'1"









 Weight  150.00 lb  

 

 Heart Rate  78 /min  

 

 BP Systolic Sitting  96 mmHg  

 

 BP Diastolic Sitting  60 mmHg  

 

 Respiratory Rate  14 /min  

 

 Body Temperature  98.8 F  

 

 BMI (Body Mass Index)  28.3 kg/m2  









 2018  1:18pm  Height  61 inches  5'1"









 Weight  150.00 lb  

 

 Heart Rate  74 /min  

 

 Respiratory Rate  16 /min  

 

 Body Temperature  98.8 F  

 

 BMI (Body Mass Index)  28.3 kg/m2  









 2018 11:13am  Height  61 inches  5'1"









 Weight  150.00 lb  

 

 BP Systolic  124 mmHg  

 

 BP Diastolic  84 mmHg  

 

 Body Temperature  97.8 F  

 

 Pain Level  0  

 

 BMI (Body Mass Index)  28.3 kg/m2  









 11/15/2018  1:42pm  Height  61 inches  5'1"









 Weight  150.00 lb  

 

 Heart Rate  60 /min  

 

 BP Systolic Sitting  130 mmHg  

 

 BP Diastolic Sitting  88 mmHg  

 

 Respiratory Rate  14 /min  

 

 Body Temperature  98.9 F  

 

 BMI (Body Mass Index)  28.3 kg/m2  









 2018 12:17pm  Height  61 inches  5'1"









 Weight  150.00 lb  

 

 BP Systolic  138 mmHg  

 

 BP Diastolic  90 mmHg  

 

 Body Temperature  98.4 F  

 

 BMI (Body Mass Index)  28.3 kg/m2  









 10/22/2018 10:55am  Height  61 inches  5'1"









 Weight  150.00 lb  

 

 BP Systolic  140 mmHg  

 

 BP Diastolic  86 mmHg  

 

 Body Temperature  97.5 F  

 

 Pain Level  0  

 

 BMI (Body Mass Index)  28.3 kg/m2  









 2018 11:30am  Height  61 inches  5'1"









 Weight  152.00 lb  

 

 Heart Rate  76 /min  

 

 BP Systolic Sitting  168 mmHg  

 

 BP Diastolic Sitting  92 mmHg  

 

 Body Temperature  98.7 F  

 

 Pain Level  0  

 

 BMI (Body Mass Index)  28.7 kg/m2  









 2018 12:05pm  Height  61.5 inches  5'1.50"









 Heart Rate  100 /min  

 

 BP Systolic  148 mmHg  

 

 BP Diastolic  92 mmHg  

 

 Respiratory Rate  20 /min  

 

 Body Temperature  98.0 F  

 

 Pain Level  5  









 08/15/2018  8:55am  Height  61.5 inches  5'1.50"









 Weight  157.00 lb  

 

 Heart Rate  72 /min  

 

 BP Systolic  130 mmHg  

 

 BP Diastolic  84 mmHg  

 

 BMI (Body Mass Index)  29.2 kg/m2  







Results







 Test  Date  Facility  Test  Result  H/L  Range  Note

 

 Body Fluid C&S  2018  [facility]  Body Fluid  SEE RESULT      
1, 2



     [address]  Cult   [place] [address]  Stain        



     [phone]          

 

 Laboratory test  2018  [facility]  Fungal Cult  SEE RESULT   
   3



 finding    [address]  Other [place] [address]          



     [phone]          

 

 Body Fluid Cell  2018  [facility]  Body Fluid  Synovial Fluid
      



 Count    [address]  [place] [address]          



     [phone]          









 Body Fluid Neutrophils  87 %      

 

 Body Fluid Band  3 %      

 

 Body Fluid Lymph  6 %      

 

 Body Fluid Mono  4 %      

 

 Body Fluid Total Cells Counted  100      

 

 Body Fluid WBC  21026 /mcL  N    4

 

 Body Fluid RBC  2558 /mcL      

 

 Body Fluid Appearance  Cloudy      

 

 Body Fluid Color  Yellow      

 

 Body Fluid Volume  TNP mL      

 

 Fluid Reviewed By MD  (SEE NOTE)      5









 Laboratory test  2018  [facility]  Body Fluid  None Seen    
None Seen  6



 finding    [address]  Crystals        



     [place] [address]          



     [phone]          

 

 Lyme Disease PCR  2018  [facility]  Lyme Disease  synovial   
   



 Tissue/Fluid    [address]  Source  fluid      



     [place], [place] [id number] [phone]          









 B. burgdorferi PCR  Negative    Negative  

 

 B. mayonii PCR  Negative    Negative  

 

 B. garinii/B. afzellii PCR  Negative    Negative  7









 Laboratory test  2018  [facility]  Fungal Cult  SEE RESULT   
   8



 finding    [address]  Other Sources  [place]      



     [place], [place] [id number] [phone]          









 1  [id number]

 

 2  SEE RESULT BELOW



   -----------------------------------------------------------------------------
---------------



   Name:  STARLA BLOUNT                 : 1965    Attend Dr: Kirsten HERNANDEZ



   Acct:  [id number]  Unit: [id number]  AGE: 53            Location:  [facility]



   Re18                        SEX: F             Status:    REG REF



   -----------------------------------------------------------------------------
---------------



   



   SPEC: [id number]         ANAMIKA:       SUBM DR: Kirsten HERNANDEZ



   REQ:  [id number]              RECD:   



   STATUS: COMP



   _



   SOURCE: BODY FLUID     SPDESC:KNEE LEFT



   ORDERED:  BF Cult/GS, Anaerobic Cult, MRSA/SA SSTI, AFB Cult Smear



   COMMENTS: [id number]



   Verbal to DR ARREOLA by [id number] at 1646 on 18.



   Results read back accurately.



   



   -----------------------------------------------------------------------------
---------------



   Procedure                         Result                         Reported   
        Site



   -----------------------------------------------------------------------------
---------------



   Body Fluid Gram Stain  Final                                     18-
1522      ML



   4+ Neutrophils



   



   No Organisms Seen



   



   Preparation                 By Cytospin Smear



   



   Body Fluid Culture  Final                                        18-
0825      ML



   



   Organism 1                     STAPHYLOCOCCUS AUREUS



   Quantity                    1+



   



   1. STAPHYLOCOCCUS AUREUS



   M.I.C.      RX



   --------- ------



   Penicillin                     0.06        S



   Clindamycin                    <=0.25      S



   Erythromycin                   <=0.25      S



   Gentamicin                     <=0.5       S



   Linezolid                      2           S



   Oxacillin                      <=0.25      S



   * Quinupristin/Dalfopristin      <=0.25      S



   Rifampin                       <=0.5       S



   Tetracycline                   <=1         S



   Doxycycline - Deduced                      S



   * Minocycline - Deduced                      S



   Trimethoprim/Sulfamethoxazole  <=10        S



   Vancomycin                     1           S



   



   ** CONTINUED ON NEXT PAGE **



   



   DEPARTMENT OF PATHOLOGY,  [address] [place] [address]



   Phone # [phone]      Fax #[phone]



   Mega Pollard M.D. Director     [facility] # [id number]



   



   



   



   -----------------------------------------------------------------------------
---------------



   Patient: STARLA BLOUNT                  [id number]     (Continued)



   -----------------------------------------------------------------------------
---------------



   



   



   Specimen: [id number]    Collected:   Received: 895
    (Continued)



   



   -----------------------------------------------------------------------------
---------------



   Procedure                         Result                         Reported   
        Site



   -----------------------------------------------------------------------------
---------------



   Body Fluid Culture  Final   (continued)                          825



   1. STAPHYLOCOCCUS AUREUS  (continued)



   M.I.C.      RX



   --------- ------



   Imipenem-Deduced                           S



   * Ampicillin/Sulbactam-Deduced               S



   Cefazolin-Deduced                          S



   



   * These antibiotics are not available in the [facility] Formulary



   



   Contact the Microbiology Department for any additional



   antibiotic reporting.



   



   Anaerobic Culture  Final                                         825      ML



   No Growth Day 4



   



   MRSA/S. aureus SSTI PCR  Final                                   18-
1639      ML



   Organism 1                     MRSA NEGATIVE



   Organism 2                     S.AUREUS POSITIVE



   



   Acid Fast Stain - Direct  Final                                  18-
1554      ML



   AFB Smear Result            No Acid Fast Bacillus Present (Negative)



   Preparation                 By Cytospin Smear



   



   Due to limited sensitivity of the smear, results should be



   used as an adjunct in evaluating the patient's status. This



   specimen has been sent to referral laboratory for



   mycobacterial culture.



   



   -----------------------------------------------------------------------------
---------------



   * ML - Main Lab



   .



   



   



   



   



   



   



   ** END OF REPORT **



   



   DEPARTMENT OF PATHOLOGY,  [address] [place] [address]



   Phone # [phone]      Fax #[phone]



   Mega Pollard M.D. Director     [facility] # [id number]

 

 3  SEE RESULT BELOW



   -----------------------------------------------------------------------------
---------------



   Name:  STARLA BLOUNT                 : 1965    Attend Dr: Kirsten HERNANDEZ



   Acct:  [id number]  Unit: [id number]  AGE: 53            Location:  [facility]



   Re18                        SEX: F             Status:    REG REF



   -----------------------------------------------------------------------------
---------------



   



   SPEC: [id number]         ANAMIKA:   18-4    SUBM DR: Kirsten HERNANDEZ



   REQ:  [id number]              RECD:   18-



   STATUS: RES



   _



   SOURCE: MISC SOURC     SPDESC:KNEE LEFT



   ORDERED:  Fungal - Other



   COMMENTS: [id number]



   



   -----------------------------------------------------------------------------
---------------



   Procedure                         Result                         Reported   
        Site



   -----------------------------------------------------------------------------
---------------



   Fungal Cult - Other Sources  Preliminary                         18-
1229      ML



   



   Fungal Culture              No Growth of Mycotic Organisms 1 week



   



   -----------------------------------------------------------------------------
---------------



   * ML - Main Lab



   .



   



   



   



   



   



   



   



   



   



   



   



   



   



   



   



   



   



   



   



   



   



   



   



   



   ** END OF REPORT **



   



   DEPARTMENT OF PATHOLOGY,  [address] [place] [address]



   Phone # [phone]      Fax #[phone]



   Mega Pollard M.D. Director     [facility] # [id number]

 

 4  -- REFERENCE VALUE --



   Synovial: <150/mcL



   Peritoneal: <500/mcL



   Pleural: <500/mcL



   Pericardial: <500/mcL

 

 5  Acute inflammation.  Recommend correlation with



   microbiologic culture studies.



   



   Reviewed by Fina Rutherford MD

 

 6  [id number]



   What is the body fluid source?: Synovial (Joint) Fluid

 

 7  -------------------ADDITIONAL INFORMATION-------------------



   This test was developed and its performance characteristics



   determined by [facility] in a manner consistent with CLIA



   requirements. This test has not been cleared or approved by



   the U.S. Food and Drug Administration.



   Test Performed by:



   [facility] - [address] [address]

 

 8  SEE RESULT BELOW



   -----------------------------------------------------------------------------
---------------



   Name:  STARLA BLOUNT                 : 1965    Attend Dr: Kirsten HERNANDEZ



   Acct:  [id number]  Unit: [id number]  AGE: 53            Location:  [facility]



   Re18                        SEX: F             Status:    REG REF



   -----------------------------------------------------------------------------
---------------



   



   SPEC: [id number]         ANAMIKA:       SUBM DR: Kirsten HERNANDEZ



   REQ:  [id number]              RECD:   18-



   STATUS: RES



   _



   SOURCE: MISC SOUR     SPDESC:KNEE LEFT



   ORDERED:  Fungal - Other



   COMMENTS: [id number]



   



   -----------------------------------------------------------------------------
---------------



   Procedure                         Result                         Reported   
        Site



   -----------------------------------------------------------------------------
---------------



   Fungal Cult - Other Sources  Preliminary                         18-
1127      ML



   



   Fungal Culture              No Growth of Mycotic Organisms 2 weeks



   



   -----------------------------------------------------------------------------
---------------



   * ML - Main Lab



   .



   



   



   



   



   



   



   



   



   



   



   



   



   



   



   



   



   



   



   



   



   



   



   



   



   ** END OF REPORT **



   



   DEPARTMENT OF PATHOLOGY,  [address] [place] [address]



   Phone # [phone]      Fax #[phone]



   Mega Pollard M.D. Director     [facility] # [id number]







Procedures







 Date  Code  Description  Status

 

 11/10/2018  53368  Arthrotomy Knee W/Explore/Drain/Remove Foreign Body  
Completed

 

 2018  64433  Arthroscopy,Knee For Infection,Lavage & Drainage  Completed

 

 2018  34345  Inject/Drain Joint/Bursa Major W/O US  Completed

 

 10/11/2018  92817  Arthroscopy,Knee,Meniscectomy Medial Or Lateral  Completed

 

 10/11/2018  20882  Arthroscopy,Knee,Meniscectomy Medial Or Lateral  Completed







Encounters







 Type  Date  Location  Provider  Dx  Diagnosis

 

 Office Visit  11/15/2018  [facility]  Collin PETE  M00.062  
Staphylococcal



   1:40p  Darlyn Vasquez M.D.    arthritis, left knee



     Diseases      









 R79.82  Elevated C-reactive protein (CRP)

 

 B95.61  Methicillin suscep staph infct causing dis classd elswhr

 

 Z79.2  Long term (current) use of antibiotics









 Office Visit  2018  [facility]  M00.062  
Staphylococcal



   9:15a  johny Rolle MD    arthritis, left



     Hospitalists      knee









 I10  Essential (primary) hypertension

 

 F41.9  Anxiety disorder, unspecified









 Office Visit  11/10/2018  [facility]  Darian Ley  M00.062  
Staphylococcal



   9:14a  johny Rolle MD    arthritis, left



     Hospitalists      knee









 I10  Essential (primary) hypertension

 

 F41.9  Anxiety disorder, unspecified









 Office Visit  2018  [facility]  Collin PETE  M00.062  Staphylococcal



   7:55a  For Darlyn Vasquez M.D.    arthritis, left



     Diseases      knee









 R79.82  Elevated C-reactive protein (CRP)









 Office Visit  2018  [facility]  Darian Ley  M00.062  
Staphylococcal



   9:14a  johny Rolle MD    arthritis, left



     Hospitalists      knee









 I10  Essential (primary) hypertension

 

 F41.9  Anxiety disorder, unspecified









 Office Visit  2018  [facility]  Collin PETE  M00.062  Staphylococcal



   7:31a  For Infectious  HILARIO Vasquez    arthritis, left



     Diseases      knee









 B95.61  Methicillin suscep staph infct causing dis classd elswhr









 Office Visit  2018  [facility]  Darian Ley  M00.062  
Staphylococcal



   9:14a  Assoc,johny Lozano MD    arthritis, left



     Hospitalists      knee









 I10  Essential (primary) hypertension

 

 F41.9  Anxiety disorder, unspecified









 Office Visit  2018  [facility]  Lb  M00.062  Staphylococcal



   9:13a  Assoc,johny Bustamante N.PSun    arthritis, left



     Hospitalists      knee









 I10  Essential (primary) hypertension

 

 F41.9  Anxiety disorder, unspecified

 

 E78.5  Hyperlipidemia, unspecified









 Office Visit  2018 11:45a  [facility]  Tori Arreola,  M25.562  
Pain in left



     [facility]  M.D.    knee









 M25.462  Effusion, left knee

 

 M17.12  Unilateral primary osteoarthritis, left knee

 

 S83.242A  Oth tear of medial meniscus, current injury, left knee, init









 Office Visit  08/15/2018  8:15a  [facility]  Tori Arreola,  M25.562  
Pain in left



     [facility]  M.D.    knee









 M25.462  Effusion, left knee

 

 M17.12  Unilateral primary osteoarthritis, left knee

 

 S83.412A  Sprain of medial collateral ligament of left knee, init







Plan of Treatment

Future Appointment(s):2018  1:45 pm - Tori [facility]ola M.D[facility] - Collin Vasquez M.D.M00.062 Staphylococcal 
arthritis, left kneeComments:to finish 4 weeks ancef, tolerating it well, knee 
improved and incr crp normalized; call if any pain, swelling, rmcdckvxcF37.2 
Long term (current) use of jhseowmqddmS51.61 Methicillin suscep staph infct 
causing dis classd elswhr

## 2018-12-21 NOTE — XMS REPORT
Continuity of Care Document (CCD)

 Created on:2018



Patient:Starla Blount

Sex:Female

:1965

External Reference #:[id number]





Demographics







 Address  [address] [place], [place] [address]

 

 Mobile Phone  [phone]

 

 Email Address  [email or web address]

 

 Preferred Language  en

 

 Marital Status  Not  or 

 

 Christianity Affiliation  Unknown

 

 Race  White

 

 Ethnic Group  Not  or 









Author







 Name  Mary Schmitt









Support







 Name  Relationship  Address  Phone

 

 Jai Blount  Unavailable  Unavailable  [phone]









Care Team Providers







 Name  Role  Phone

 

 Pal Maciel NP  Primary Care Physician  Unavailable









Payers







 Type  Date  Identification Numbers  Payment Provider  Subscriber

 

     Policy Number: [id number]  [facility]  Starla Blount









 PayID: [id number]  [address]









 [place] [address]







Advance Directives







 Description

 

 No Information Available







Problems







 Date  Description  Provider  Status

 

 Onset: 08/15/2018  Localized, primary osteoarthritis  Tori Arreola M.D.  
Active

 

 Onset: 08/15/2018  Sprain of medial collateral ligament of  Tori Arreola M.D.
  Active



   knee    

 

 Onset: 2018  Acute meniscal tear, medial, posterior  Tori Arreola M.D.  
Active



   horn    







Family History







 Date  Family Member(s)  Problem(s)  Comments

 

   General  Hypertension  

 

   General  Cancer  







Social History







 Type  Date  Description  Comments

 

 Birth Sex    Unknown  

 

 Lives With    Spouse  

 

 Occupation    Nurse  

 

 ETOH Use    Occasionally consumes alcohol  

 

 Tobacco Use  Start: Unknown  Patient was a smoker, current  



     status is unknown  

 

 Smoking Status  Reviewed: 18  Patient was a smoker, current  



     status is unknown  

 

 Exercise Type/Frequency    Exercises sporadically  







Allergies, Adverse Reactions, Alerts







 Date  Description  Reaction  Status  Severity  Comments

 

 08/15/2018  Penicillin  respiratory childhood rxn  Active    







Medications







 Medication  Date  Status  Form  Strength  Qnty  SIG  Indications  Ordering



                 Provider

 

 Cefazolin    Active  Solution  1gm    2gm IV q    Collin D.



 Sodium  018    Rec      8 hrs x    Macqueen,



             28 days    M.D.



             through    



             [facility]    

 

 Percocet  10/10/2  Active  Tablets  5-325mg  42tabs  1 by    Tori



   018          mouth    Maxwell,



             every 4    M.D.



             hours as    



             needed    



             pain    

 

 Aspirin  10/10/2  Active  Tablets  325mg  28tabs  take 1 by    Tori



   018          mouth    Maxwell,



             twice a    M.D.



             day for    



             two weeks    

 

 Lexapro    Active  Tablets  20mg    1 by    Unknown



   000          mouth    



             every day    

 

 Vitamin D  0  Active            Unknown



   000              

 

 Atenolol    Active  Tablets  25mg        O'claudia,



   000              MD Klarissa

 

 Morphine  0  Active  Caps ER  Unknown    take one    Unknown



 Sulfate ER  000    24HR      tab by    



             mouth    



             every 12    



             hours as    



             needed.    

 

                 

 

 Meloxicam  08/15/2  Hx  Tablets  15mg  30tabs  1 by  M25.562  Tori



   018 -          mouth    Maxwell,



             every day    M.D.



   018              







Immunizations







 Description

 

 No Information Available







Vital Signs







 Date  Vital  Result  Comment

 

 2018  1:18pm  Height  61 inches  5'1"









 Weight  150.00 lb  

 

 Heart Rate  74 /min  

 

 Respiratory Rate  16 /min  

 

 Body Temperature  98.8 F  

 

 BMI (Body Mass Index)  28.3 kg/m2  









 2018 11:13am  Height  61 inches  5'1"









 Weight  150.00 lb  

 

 BP Systolic  124 mmHg  

 

 BP Diastolic  84 mmHg  

 

 Body Temperature  97.8 F  

 

 Pain Level  0  

 

 BMI (Body Mass Index)  28.3 kg/m2  









 11/15/2018  1:42pm  Height  61 inches  5'1"









 Weight  150.00 lb  

 

 Heart Rate  60 /min  

 

 BP Systolic Sitting  130 mmHg  

 

 BP Diastolic Sitting  88 mmHg  

 

 Respiratory Rate  14 /min  

 

 Body Temperature  98.9 F  

 

 BMI (Body Mass Index)  28.3 kg/m2  









 2018 12:17pm  Height  61 inches  5'1"









 Weight  150.00 lb  

 

 BP Systolic  138 mmHg  

 

 BP Diastolic  90 mmHg  

 

 Body Temperature  98.4 F  

 

 BMI (Body Mass Index)  28.3 kg/m2  









 10/22/2018 10:55am  Height  61 inches  5'1"









 Weight  150.00 lb  

 

 BP Systolic  140 mmHg  

 

 BP Diastolic  86 mmHg  

 

 Body Temperature  97.5 F  

 

 Pain Level  0  

 

 BMI (Body Mass Index)  28.3 kg/m2  









 2018 11:30am  Height  61 inches  5'1"









 Weight  152.00 lb  

 

 Heart Rate  76 /min  

 

 BP Systolic Sitting  168 mmHg  

 

 BP Diastolic Sitting  92 mmHg  

 

 Body Temperature  98.7 F  

 

 Pain Level  0  

 

 BMI (Body Mass Index)  28.7 kg/m2  









 2018 12:05pm  Height  61.5 inches  5'1.50"









 Heart Rate  100 /min  

 

 BP Systolic  148 mmHg  

 

 BP Diastolic  92 mmHg  

 

 Respiratory Rate  20 /min  

 

 Body Temperature  98.0 F  

 

 Pain Level  5  









 08/15/2018  8:55am  Height  61.5 inches  5'1.50"









 Weight  157.00 lb  

 

 Heart Rate  72 /min  

 

 BP Systolic  130 mmHg  

 

 BP Diastolic  84 mmHg  

 

 BMI (Body Mass Index)  29.2 kg/m2  







Results







 Test  Date  Facility  Test  Result  H/L  Range  Note

 

 Body Fluid C&S  2018  [facility]  Body Fluid  SEE RESULT      
1, 2



     [address]  Cult   Gram  [place]      



     [place] [address]  Stain        



     [phone]          

 

 Laboratory test  2018  [facility]  Fungal Cult  SEE RESULT   
   3



 finding    [address]  Other Sources  [place]      



     [place], [place] [address] [phone]          

 

 Body Fluid Cell  2018  [facility]  Body Fluid  Synovial Fluid
      



 Count    [address]  Source        



     [place], [place] [address] [phone]          









 Body Fluid Neutrophils  87 %      

 

 Body Fluid Band  3 %      

 

 Body Fluid Lymph  6 %      

 

 Body Fluid Mono  4 %      

 

 Body Fluid Total Cells Counted  100      

 

 Body Fluid WBC  15219 /mcL  N    4

 

 Body Fluid RBC  2558 /mcL      

 

 Body Fluid Appearance  Cloudy      

 

 Body Fluid Color  Yellow      

 

 Body Fluid Volume  TNP mL      

 

 Fluid Reviewed By MD  (SEE NOTE)      5









 Laboratory test  2018  [facility]  Body Fluid  None Seen    
None Seen  6



 finding    [address]  Crystals        



     [place], [place] [address] [phone]          

 

 Lyme Disease PCR  2018  [facility]  Lyme Disease  synovial   
   



 Tissue/Fluid    [address]  Source  fluid      



     [place], [place] [address] [phone]          









 B. burgdorferi PCR  Negative    Negative  

 

 B. mayonii PCR  Negative    Negative  

 

 B. garinii/B. afzellii PCR  Negative    Negative  7









 Laboratory test  2018  [facility]  Fungal Cult  SEE RESULT   
   8



 finding    [address]  Other Sources  [place]      



     [place], [place] [address] [phone]          









 1  [id number]

 

 2  SEE RESULT BELOW



   -----------------------------------------------------------------------------
---------------



   Name:  STARLA BLOUNT                 : 1965    Attend Dr: Kirsten HERNANDEZ



   Acct:  [id number]  Unit: [id number]  AGE: 53            Location:  [facility]



   Re18                        SEX: F             Status:    REG REF



   -----------------------------------------------------------------------------
---------------



   



   SPEC: [id number]         ANAMIKA:       SUBM DR: Kirsten HERNANDEZ



   REQ:  [id number]              RECD:   



   STATUS: COMP



   _



   SOURCE: BODY FLUID     SPDESC:KNEE LEFT



   ORDERED:  BF Cult/GS, Anaerobic Cult, MRSA/SA SSTI, AFB Cult Smear



   COMMENTS: [id number]



   Verbal to DR ARREOLA by [id number] at 1646 on 18.



   Results read back accurately.



   



   -----------------------------------------------------------------------------
---------------



   Procedure                         Result                         Reported   
        Site



   -----------------------------------------------------------------------------
---------------



   Body Fluid Gram Stain  Final                                     18-
1522      ML



   4+ Neutrophils



   



   No Organisms Seen



   



   Preparation                 By Cytospin Smear



   



   Body Fluid Culture  Final                                        18-
0825      ML



   



   Organism 1                     STAPHYLOCOCCUS AUREUS



   Quantity                    1+



   



   1. STAPHYLOCOCCUS AUREUS



   M.I.C.      RX



   --------- ------



   Penicillin                     0.06        S



   Clindamycin                    <=0.25      S



   Erythromycin                   <=0.25      S



   Gentamicin                     <=0.5       S



   Linezolid                      2           S



   Oxacillin                      <=0.25      S



   * Quinupristin/Dalfopristin      <=0.25      S



   Rifampin                       <=0.5       S



   Tetracycline                   <=1         S



   Doxycycline - Deduced                      S



   * Minocycline - Deduced                      S



   Trimethoprim/Sulfamethoxazole  <=10        S



   Vancomycin                     1           S



   



   ** CONTINUED ON NEXT PAGE **



   



   DEPARTMENT OF PATHOLOGY,  [address] [place] [address]



   Phone # [phone]      Fax #[phone]



   Mega Pollard M.D. Director [facility] # [id number]



   



   



   



   -----------------------------------------------------------------------------
---------------



   Patient: STARLA BLOUNT                  [id number]     (Continued)



   -----------------------------------------------------------------------------
---------------



   



   



   Specimen: [id number]    Collected: 18-  Received: 
    (Continued)



   



   -----------------------------------------------------------------------------
---------------



   Procedure                         Result                         Reported   
        Site



   -----------------------------------------------------------------------------
---------------



   Body Fluid Culture  Final   (continued)                          825



   1. STAPHYLOCOCCUS AUREUS  (continued)



   M.I.C.      RX



   --------- ------



   Imipenem-Deduced                           S



   * Ampicillin/Sulbactam-Deduced               S



   Cefazolin-Deduced                          S



   



   * These antibiotics are not available in the [facility] Formulary



   



   Contact the Microbiology Department for any additional



   antibiotic reporting.



   



   Anaerobic Culture  Final                                         825      ML



   No Growth Day 4



   



   MRSA/S. aureus SSTI PCR  Final                                   18-
1639      ML



   Organism 1                     MRSA NEGATIVE



   Organism 2                     S.AUREUS POSITIVE



   



   Acid Fast Stain - Direct  Final                                  18-
1554      ML



   AFB Smear Result            No Acid Fast Bacillus Present (Negative)



   Preparation                 By Cytospin Smear



   



   Due to limited sensitivity of the smear, results should be



   used as an adjunct in evaluating the patient's status. This



   specimen has been sent to referral laboratory for



   mycobacterial culture.



   



   -----------------------------------------------------------------------------
---------------



   * ML - Main Lab



   .



   



   



   



   



   



   



   ** END OF REPORT **



   



   DEPARTMENT OF PATHOLOGY,  [address] [place] [address]



   Phone # [phone]      Fax #[phone]



   Mega Pollard M.D. Director     [facility] # [id number]

 

 3  SEE RESULT BELOW



   -----------------------------------------------------------------------------
---------------



   Name:  STARLA BLOUNT                 : 1965    Attend Dr: Kirsten HERNANDEZ



   Acct:  [id number]  Unit: [id number]  AGE: 53            Location:  [facility]



   Re18                        SEX: F             Status:    REG REF



   -----------------------------------------------------------------------------
---------------



   



   SPEC: [id number]         ANAMIKA:   18-4    SUBM DR: Kirsten HERNANDEZ



   REQ:  [id number]              RECD:   



   STATUS: RES



   _



   SOURCE: MISC SOUR     SPDESC:KNEE LEFT



   ORDERED:  Fungal - Other



   COMMENTS: [id number]



   



   -----------------------------------------------------------------------------
---------------



   Procedure                         Result                         Reported   
        Site



   -----------------------------------------------------------------------------
---------------



   Fungal Cult - Other Sources  Preliminary                         18-
1229      ML



   



   Fungal Culture              No Growth of Mycotic Organisms 1 week



   



   -----------------------------------------------------------------------------
---------------



   * ML - Main Lab



   .



   



   



   



   



   



   



   



   



   



   



   



   



   



   



   



   



   



   



   



   



   



   



   



   



   ** END OF REPORT **



   



   DEPARTMENT OF PATHOLOGY,  [address] [place] [address]



   Phone # [phone]      Fax #[phone]



   Mega Pollard M.D. Director     [facility] # [id number]

 

 4  -- REFERENCE VALUE --



   Synovial: <150/mcL



   Peritoneal: <500/mcL



   Pleural: <500/mcL



   Pericardial: <500/mcL

 

 5  Acute inflammation.  Recommend correlation with



   microbiologic culture studies.



   



   Reviewed by Fina Rutherford MD

 

 6  [id number]



   What is the body fluid source?: Synovial (Joint) Fluid

 

 7  -------------------ADDITIONAL INFORMATION-------------------



   This test was developed and its performance characteristics



   determined by [facility] in a manner consistent with CLIA



   requirements. This test has not been cleared or approved by



   the U.S. Food and Drug Administration.



   Test Performed by:



   [facility] - [address] [address]

 

 8  SEE RESULT BELOW



   -----------------------------------------------------------------------------
---------------



   Name:  STARLA BLOUNT                 : 1965    Attend Dr: Kirsten HERNANDEZ



   Acct:  [id number]  Unit: [id number]  AGE: 53            Location:  [facility]



   Re18                        SEX: F             Status:    REG REF



   -----------------------------------------------------------------------------
---------------



   



   SPEC: [id number]         ANAMIKA:       [facility] DR: Kirsten HERNANDEZ



   REQ:  [id number]              RECD:   



   STATUS: RES



   _



   SOURCE: MISC SOURC     SPDESC:KNEE LEFT



   ORDERED:  Fungal - Other



   COMMENTS: [id number]



   



   -----------------------------------------------------------------------------
---------------



   Procedure                         Result                         Reported   
        Site



   -----------------------------------------------------------------------------
---------------



   Fungal Cult - Other Sources  Preliminary                         18-
5973      ML



   



   Fungal Culture              No Growth of Mycotic Organisms 2 weeks



   



   -----------------------------------------------------------------------------
---------------



   * ML - Main Lab



   .



   



   



   



   



   



   



   



   



   



   



   



   



   



   



   



   



   



   



   



   



   



   



   



   



   ** END OF REPORT **



   



   DEPARTMENT OF PATHOLOGY,  [address] [place] [address]



   Phone # [phone]      Fax #[phone]



   Mega Pollard M.D. Director     [facility] # [id number]







Procedures







 Date  Code  Description  Status

 

 11/10/2018  91109  Arthrotomy Knee W/Explore/Drain/Remove Foreign Body  
Completed

 

 2018  29346  Arthroscopy,Knee For Infection,Lavage & Drainage  Completed

 

 2018  12867  Inject/Drain Joint/Bursa Major W/O US  Completed

 

 10/11/2018  11763  Arthroscopy,Knee,Meniscectomy Medial Or Lateral  Completed

 

 10/11/2018  08154  Arthroscopy,Knee,Meniscectomy Medial Or Lateral  Completed







Encounters







 Type  Date  Location  Provider  Dx  Diagnosis

 

 Office Visit  11/15/2018  [facility]  Collin PETE  M00.062  
Staphylococcal



   1:40p  Infectious  HILARIO Vasquez    arthritis, left knee



     Diseases      









 R79.82  Elevated C-reactive protein (CRP)

 

 B95.61  Methicillin suscep staph infct causing dis classd elswhr

 

 Z79.2  Long term (current) use of antibiotics









 Office Visit  2018  [facility]  Darian Ley  M00.062  
Staphylococcal



   9:15a  johny Rolle MD    arthritis, left



     Hospitalists      knee









 I10  Essential (primary) hypertension

 

 F41.9  Anxiety disorder, unspecified









 Office Visit  11/10/2018  [facility]  Darian Ley  M00.062  
Staphylococcal



   9:14a  johny Rolle MD    arthritis, left



     Hospitalists      knee









 I10  Essential (primary) hypertension

 

 F41.9  Anxiety disorder, unspecified









 Office Visit  2018  [facility]  Collin PETE  M00.062  Staphylococcal



   7:55a  For Darlyn Vasquez M.D.    arthritis, left



     Diseases      knee









 R79.82  Elevated C-reactive protein (CRP)









 Office Visit  2018  [facility]  Darian Ley  M00.062  
Staphylococcal



   9:14a  johny Rolle MD    arthritis, left



     Hospitalists      knee









 I10  Essential (primary) hypertension

 

 F41.9  Anxiety disorder, unspecified









 Office Visit  2018  [facility]  Collin PETE  M00.062  Staphylococcal



   7:31a  For Darlyn Vasquez M.D.    arthritis, left



     Diseases      knee









 B95.61  Methicillin suscep staph infct causing dis classd elswhr









 Office Visit  2018  [facility]  Darian Ley  M00.062  
Staphylococcal



   9:14a  Assoc,johny Lozano MD    arthritis, left



     Hospitalists      knee









 I10  Essential (primary) hypertension

 

 F41.9  Anxiety disorder, unspecified









 Office Visit  2018  [facility]  Lb  M00.062  Staphylococcal



   9:13a  Assoc,johny Bustamante N.P.    arthritis, left



     Hospitalists      knee









 I10  Essential (primary) hypertension

 

 F41.9  Anxiety disorder, unspecified

 

 E78.5  Hyperlipidemia, unspecified









 Office Visit  2018 11:45a  [facility]  Tori Arreola,  M25.562  
Pain in left



     [facility]  M.D.    knee









 M25.462  Effusion, left knee

 

 M17.12  Unilateral primary osteoarthritis, left knee

 

 S83.242A  Oth tear of medial meniscus, current injury, left knee, init









 Office Visit  08/15/2018  8:15a  [facility]  Tori Arreola,  M25.562  
Pain in left



     Of [facility]  M.D.    knee









 M25.462  Effusion, left knee

 

 M17.12  Unilateral primary osteoarthritis, left knee

 

 S83.412A  Sprain of medial collateral ligament of left knee, init







Plan of Treatment

Future Appointment(s):2018  1:45 pm - [facility]ola M[facility] at [facility]  2:20 pm - Collin Vasquez M.D. at [facility] - Karmen Paul, PAM25.562 Pain in 
left kneeFollow up:Follow up:   3 weeks with LebxuD75.61 Methicillin 
susceptible Staphylococcus aureus infection as tM25.462 Effusion, left knee

## 2018-12-21 NOTE — ED
Throat Pain/Nasal Congestion





- HPI Summary


HPI Summary: 


Pt. is a 53 year-old female who presents emergency department for evaluation of 

right ear pain and swelling times roughly one week. Pt. states she started to 

notice itching in her right ear canal about a week ago that progressed pain and 

swelling. Associated symptoms of fever/chills and decreased hearing on the 

right. Pt. notes recent complications to a left knee arthroscopy she had 

several weeks ago.  Pt. notes left knee became infected and she had a picc line 

to left arm that was removed last week. Pt. was seen at an outside urgent care 

earlier today and was referred to ER for further evaluation. Pt.'s  

notes they are concerned she may be septic. Pt. otherwise denies pain to left 

knee and denies pain, swelling or redness to left arm where pic was in place. 

No past medical hx. Sxs are mild in severity. Pt. notes recently baby sitting 

grandchildren. Touching right ear makes sxs worse. Ibuprofen mildly improves 

pain.








- History of Current Complaint


Chief Complaint: EDGeneral


Time Seen by Provider: 12/21/18 15:21


Hx Obtained From: Patient





- Allergies/Home Medications


Allergies/Adverse Reactions: 


 Allergies











Allergy/AdvReac Type Severity Reaction Status Date / Time


 


penicillin V Allergy Severe Difficulty Verified 10/11/18 07:09





   Breathing  














PMH/Surg Hx/FS Hx/Imm Hx


Previously Healthy: Yes


Endocrine/Hematology History: 


   Denies: Hx Diabetes


Cardiovascular History: Reports: Hx Hypercholesterolemia, Hx Hypertension


   Denies: Hx Pacemaker/ICD


Respiratory History: 


   Denies: Hx Asthma


 History: Reports: Hx Kidney Stones - 2 months ago, Other  Problems/

Disorders - 2005 BLADDER SUSPENSION


   Denies: Hx Renal Disease


Musculoskeletal History: Reports: Hx Arthritis - back of neck, osteoarthritis 

left knee, Other Musculoskeletal History - DDD


Sensory History: Reports: Hx Contacts or Glasses


   Denies: Hx Cataracts, Hx Glaucoma, Hx Hearing Aid


Opthamlomology History: Reports: Hx Contacts or Glasses


   Denies: Hx Cataracts, Hx Glaucoma


Neurological History: Reports: Hx Migraine - hx of migraines


Psychiatric History: Reports: Hx Anxiety, Hx Depression - on daily meds


   Denies: Hx Panic Disorder





- Cancer History


Hx Chemotherapy: No


Hx Radiation Therapy: No





- Surgical History


Surgery Procedure, Year, and Place: 2007 EXTOPIC PREG.  [facility];.  2007 BLADDER 

SUSPENSION  [facility];.  2014 UTERINE ABLASION  [facility] [place];.  10/11/2018 L knee scop


Hx Anesthesia Reactions: No


Infectious Disease History: No


Infectious Disease History: 


   Denies: Traveled Outside the [place] in Last 30 Days





- Social History


Alcohol Use: Occasionally


Alcohol Amount: 1-2 DRINKS/ MONTH


Substance Use Type: Reports: None


Smoking Status (MU): Light Every Day Tobacco Smoker


Amount Used/How Often: 2 pks per week


Have You Smoked in the Last Year: No





Review of Systems


Positive: Fever, Chills


Eyes: Negative


Positive: Ear Ache


Cardiovascular: Negative


Respiratory: Negative


Gastrointestinal: Negative


Musculoskeletal: Negative


Skin: Negative


Neurological: Negative


All Other Systems Reviewed And Are Negative: Yes





Physical Exam


Triage Information Reviewed: Yes


Vital Signs On Initial Exam: 


 Initial Vitals











Temp Pulse Resp BP Pulse Ox


 


 99.1 F   100   18   181/120   99 


 


 12/21/18 12:31  12/21/18 12:31  12/21/18 12:31  12/21/18 12:31  12/21/18 12:31











Vital Signs Reviewed: Yes


Appearance: Positive: Well-Appearing - Pt. sitting up in bed in NAD.  

present.


Skin: Positive: Warm, Dry


Head/Face: Positive: Normal Head/Face Inspection


Eyes: Positive: Normal, EOMI, Conjunctiva Clear


ENT: Positive: Other - Left ear unremarkable. Oral pharynx patent. Right 

external canal is significantly edematous and very tender to touch. Edema 

extends to the preauricular region. No overlying erythema. or induration. No 

pain over mastiod. No trismus or muffled voice.


Neck: Positive: Supple


Respiratory/Lung Sounds: Positive: Clear to Auscultation, Breath Sounds Present


Cardiovascular: Positive: Normal, RRR


Musculoskeletal: Positive: Other - Healing puncture to righ upper arm where 

picc line was inplace without edema, redness or pain.


Neurological: Positive: Normal, CN Intact II-III





Diagnostics





- Vital Signs


 Vital Signs











  Temp Pulse Resp BP Pulse Ox


 


 12/21/18 14:38  98.6 F  99  16  152/112  100


 


 12/21/18 12:31  99.1 F  100  18  181/120  99














- Laboratory


Result Diagrams: 


 12/21/18 16:03





 12/21/18 16:03


Lab Statement: Any lab studies that have been ordered have been reviewed, and 

results considered in the medical decision making process.





EENT Course/Dx





- Course


Course Of Treatment: Pt. presenting for a right otitis externa with significant 

edema. Afebrile in ED but pt. notes motrin PTA. She overall is very well 

appearing and nontoxic. She is concerned ear infection may be related to recent 

left knee infection. Pt. has zero knee pain and picc site is noninfected 

without signs of DVT. Will check basic labs given pt.'s concerns. She was given 

IM toradol. Ear wick was placed to right ear and ciprodex administered.  WBC is 

normal. Minimally elevated CRP. Pt. comfortable with dc. To use drops 4 drops 

every 12 hours x 7-10 days. To continue motrin for pain and swelling. Apply 

warm compresses. Schedule close f.u with PCP. Will return to ER if sxs change 

or worsen.





- Differential Diagnoses


Differential Diagnoses: Cellulitis, Dental Abscess, Dental Caries, Mastoiditis, 

Otitis Externa, Otitis Media, Sinusitis, Tonsilitis





- Diagnoses


Provider Diagnoses: 


 Otitis externa








Discharge





- Sign-Out/Discharge


Documenting (check all that apply): Patient Departure





- Discharge Plan


Condition: Improved


Disposition: HOME


Patient Education Materials:  Otitis Externa (ED)


Referrals: 


Pal Maciel, NP [Primary Care Provider] - 


Additional Instructions: 


Follow up with your PCP on Monday


Ciprodex ear drop instructions: 4 drops to right ear every 12 hours x 7-10 days


Apply warm compresses to face


Ibuprofen for pain as directed


Return to ER if symptoms change or worsen 





- Billing Disposition and Condition


Condition: IMPROVED


Disposition: Home